# Patient Record
Sex: MALE | Race: WHITE | NOT HISPANIC OR LATINO
[De-identification: names, ages, dates, MRNs, and addresses within clinical notes are randomized per-mention and may not be internally consistent; named-entity substitution may affect disease eponyms.]

---

## 2017-01-09 ENCOUNTER — APPOINTMENT (OUTPATIENT)
Dept: NEUROLOGY | Facility: CLINIC | Age: 82
End: 2017-01-09

## 2017-01-11 ENCOUNTER — MEDICATION RENEWAL (OUTPATIENT)
Age: 82
End: 2017-01-11

## 2017-01-19 ENCOUNTER — APPOINTMENT (OUTPATIENT)
Dept: INTERNAL MEDICINE | Facility: CLINIC | Age: 82
End: 2017-01-19

## 2017-01-19 VITALS
HEART RATE: 75 BPM | WEIGHT: 137 LBS | OXYGEN SATURATION: 97 % | RESPIRATION RATE: 12 BRPM | SYSTOLIC BLOOD PRESSURE: 130 MMHG | DIASTOLIC BLOOD PRESSURE: 77 MMHG | BODY MASS INDEX: 22.82 KG/M2 | HEIGHT: 65 IN | TEMPERATURE: 97.8 F

## 2017-01-25 LAB
ALBUMIN SERPL ELPH-MCNC: 4.3 G/DL
ALP BLD-CCNC: 81 U/L
ALT SERPL-CCNC: 11 U/L
ANION GAP SERPL CALC-SCNC: 10 MMOL/L
AST SERPL-CCNC: 17 U/L
BILIRUB SERPL-MCNC: 0.4 MG/DL
BUN SERPL-MCNC: 17 MG/DL
CALCIUM SERPL-MCNC: 10 MG/DL
CHLORIDE SERPL-SCNC: 104 MMOL/L
CHOLEST SERPL-MCNC: 188 MG/DL
CHOLEST/HDLC SERPL: 3.9 RATIO
CO2 SERPL-SCNC: 28 MMOL/L
CREAT SERPL-MCNC: 0.85 MG/DL
GLUCOSE SERPL-MCNC: 119 MG/DL
HBA1C MFR BLD HPLC: 6.1 %
HDLC SERPL-MCNC: 48 MG/DL
LDLC SERPL CALC-MCNC: 108 MG/DL
POTASSIUM SERPL-SCNC: 4.9 MMOL/L
PROT SERPL-MCNC: 7.2 G/DL
SODIUM SERPL-SCNC: 142 MMOL/L
TRIGL SERPL-MCNC: 162 MG/DL

## 2017-02-01 LAB
BASOPHILS # BLD AUTO: 0.04 K/UL
BASOPHILS NFR BLD AUTO: 0.6 %
EOSINOPHIL # BLD AUTO: 0.27 K/UL
EOSINOPHIL NFR BLD AUTO: 4.2 %
HCT VFR BLD CALC: 48.7 %
HGB BLD-MCNC: 15.6 G/DL
IMM GRANULOCYTES NFR BLD AUTO: 0.2 %
LYMPHOCYTES # BLD AUTO: 2.73 K/UL
LYMPHOCYTES NFR BLD AUTO: 42.5 %
MAN DIFF?: NORMAL
MCHC RBC-ENTMCNC: 30.8 PG
MCHC RBC-ENTMCNC: 32 GM/DL
MCV RBC AUTO: 96.1 FL
MONOCYTES # BLD AUTO: 0.67 K/UL
MONOCYTES NFR BLD AUTO: 10.4 %
NEUTROPHILS # BLD AUTO: 2.7 K/UL
NEUTROPHILS NFR BLD AUTO: 42.1 %
PLATELET # BLD AUTO: 201 K/UL
RBC # BLD: 5.07 M/UL
RBC # FLD: 14.8 %
WBC # FLD AUTO: 6.42 K/UL

## 2017-02-13 ENCOUNTER — APPOINTMENT (OUTPATIENT)
Dept: NEUROLOGY | Facility: CLINIC | Age: 82
End: 2017-02-13

## 2017-02-17 ENCOUNTER — APPOINTMENT (OUTPATIENT)
Dept: INTERNAL MEDICINE | Facility: CLINIC | Age: 82
End: 2017-02-17

## 2017-02-17 VITALS
TEMPERATURE: 97.6 F | DIASTOLIC BLOOD PRESSURE: 77 MMHG | HEIGHT: 65 IN | OXYGEN SATURATION: 98 % | RESPIRATION RATE: 12 BRPM | HEART RATE: 75 BPM | BODY MASS INDEX: 23.16 KG/M2 | SYSTOLIC BLOOD PRESSURE: 145 MMHG | WEIGHT: 139 LBS

## 2017-03-02 LAB
ALBUMIN SERPL ELPH-MCNC: 4.2 G/DL
ALP BLD-CCNC: 74 U/L
ALT SERPL-CCNC: 16 U/L
ANION GAP SERPL CALC-SCNC: 10 MMOL/L
AST SERPL-CCNC: 17 U/L
BILIRUB SERPL-MCNC: 0.4 MG/DL
BUN SERPL-MCNC: 15 MG/DL
CALCIUM SERPL-MCNC: 10 MG/DL
CHLORIDE SERPL-SCNC: 105 MMOL/L
CHOLEST SERPL-MCNC: 218 MG/DL
CHOLEST/HDLC SERPL: 4.7 RATIO
CO2 SERPL-SCNC: 27 MMOL/L
CREAT SERPL-MCNC: 0.8 MG/DL
GLUCOSE SERPL-MCNC: 112 MG/DL
HDLC SERPL-MCNC: 46 MG/DL
LDLC SERPL CALC-MCNC: 133 MG/DL
POTASSIUM SERPL-SCNC: 5.8 MMOL/L
PROT SERPL-MCNC: 6.9 G/DL
SODIUM SERPL-SCNC: 142 MMOL/L
TRIGL SERPL-MCNC: 197 MG/DL

## 2017-03-03 ENCOUNTER — APPOINTMENT (OUTPATIENT)
Dept: INTERNAL MEDICINE | Facility: CLINIC | Age: 82
End: 2017-03-03

## 2017-03-03 VITALS
SYSTOLIC BLOOD PRESSURE: 108 MMHG | HEIGHT: 65 IN | WEIGHT: 141 LBS | OXYGEN SATURATION: 98 % | DIASTOLIC BLOOD PRESSURE: 67 MMHG | HEART RATE: 70 BPM | TEMPERATURE: 98.2 F | BODY MASS INDEX: 23.49 KG/M2 | RESPIRATION RATE: 12 BRPM

## 2017-03-09 ENCOUNTER — APPOINTMENT (OUTPATIENT)
Dept: INTERNAL MEDICINE | Facility: CLINIC | Age: 82
End: 2017-03-09

## 2017-03-09 VITALS
RESPIRATION RATE: 12 BRPM | HEIGHT: 65 IN | BODY MASS INDEX: 22.99 KG/M2 | SYSTOLIC BLOOD PRESSURE: 122 MMHG | TEMPERATURE: 97.7 F | OXYGEN SATURATION: 98 % | HEART RATE: 81 BPM | WEIGHT: 138 LBS | DIASTOLIC BLOOD PRESSURE: 65 MMHG

## 2017-03-09 DIAGNOSIS — M81.0 AGE-RELATED OSTEOPOROSIS W/OUT CURRENT PATHOLOGICAL FRACTURE: ICD-10-CM

## 2017-04-03 ENCOUNTER — APPOINTMENT (OUTPATIENT)
Dept: OTOLARYNGOLOGY | Facility: CLINIC | Age: 82
End: 2017-04-03

## 2017-04-03 VITALS
DIASTOLIC BLOOD PRESSURE: 71 MMHG | BODY MASS INDEX: 22.99 KG/M2 | SYSTOLIC BLOOD PRESSURE: 122 MMHG | HEIGHT: 65 IN | WEIGHT: 138 LBS | HEART RATE: 68 BPM

## 2017-04-03 DIAGNOSIS — H61.21 IMPACTED CERUMEN, RIGHT EAR: ICD-10-CM

## 2017-04-03 DIAGNOSIS — H90.3 SENSORINEURAL HEARING LOSS, BILATERAL: ICD-10-CM

## 2017-04-14 ENCOUNTER — MEDICATION RENEWAL (OUTPATIENT)
Age: 82
End: 2017-04-14

## 2017-04-24 ENCOUNTER — APPOINTMENT (OUTPATIENT)
Dept: NEUROLOGY | Facility: CLINIC | Age: 82
End: 2017-04-24

## 2017-04-24 VITALS
WEIGHT: 138 LBS | DIASTOLIC BLOOD PRESSURE: 77 MMHG | BODY MASS INDEX: 22.99 KG/M2 | HEART RATE: 77 BPM | SYSTOLIC BLOOD PRESSURE: 138 MMHG | HEIGHT: 65 IN

## 2017-04-24 DIAGNOSIS — Z82.0 FAMILY HISTORY OF EPILEPSY AND OTHER DISEASES OF THE NERVOUS SYSTEM: ICD-10-CM

## 2017-04-24 DIAGNOSIS — Z81.8 FAMILY HISTORY OF OTHER MENTAL AND BEHAVIORAL DISORDERS: ICD-10-CM

## 2017-04-24 RX ORDER — DOCUSATE SODIUM 100 MG/1
100 CAPSULE ORAL
Qty: 30 | Refills: 0 | Status: DISCONTINUED | COMMUNITY
Start: 2017-02-17 | End: 2017-04-24

## 2017-04-24 RX ORDER — LEVOFLOXACIN 500 MG/1
500 TABLET, FILM COATED ORAL DAILY
Qty: 10 | Refills: 0 | Status: DISCONTINUED | COMMUNITY
Start: 2017-03-03 | End: 2017-04-24

## 2017-05-08 ENCOUNTER — FORM ENCOUNTER (OUTPATIENT)
Age: 82
End: 2017-05-08

## 2017-05-09 ENCOUNTER — APPOINTMENT (OUTPATIENT)
Dept: NEUROLOGY | Facility: CLINIC | Age: 82
End: 2017-05-09

## 2017-05-09 ENCOUNTER — APPOINTMENT (OUTPATIENT)
Dept: MRI IMAGING | Facility: CLINIC | Age: 82
End: 2017-05-09

## 2017-05-09 ENCOUNTER — OUTPATIENT (OUTPATIENT)
Dept: OUTPATIENT SERVICES | Facility: HOSPITAL | Age: 82
LOS: 1 days | End: 2017-05-09
Payer: MEDICARE

## 2017-05-09 DIAGNOSIS — G31.84 MILD COGNITIVE IMPAIRMENT OF UNCERTAIN OR UNKNOWN ETIOLOGY: ICD-10-CM

## 2017-05-09 DIAGNOSIS — Z81.8 FAMILY HISTORY OF OTHER MENTAL AND BEHAVIORAL DISORDERS: ICD-10-CM

## 2017-05-09 PROCEDURE — 70551 MRI BRAIN STEM W/O DYE: CPT

## 2017-05-17 ENCOUNTER — NON-APPOINTMENT (OUTPATIENT)
Age: 82
End: 2017-05-17

## 2017-05-17 ENCOUNTER — APPOINTMENT (OUTPATIENT)
Dept: INTERNAL MEDICINE | Facility: CLINIC | Age: 82
End: 2017-05-17

## 2017-05-17 VITALS
TEMPERATURE: 98.3 F | SYSTOLIC BLOOD PRESSURE: 130 MMHG | DIASTOLIC BLOOD PRESSURE: 65 MMHG | OXYGEN SATURATION: 98 % | HEART RATE: 88 BPM | HEIGHT: 65 IN | WEIGHT: 140 LBS | RESPIRATION RATE: 12 BRPM | BODY MASS INDEX: 23.32 KG/M2

## 2017-05-18 LAB
25(OH)D3 SERPL-MCNC: 36.4 NG/ML
ALBUMIN SERPL ELPH-MCNC: 4.4 G/DL
ALP BLD-CCNC: 76 U/L
ALT SERPL-CCNC: 18 U/L
ANION GAP SERPL CALC-SCNC: 18 MMOL/L
AST SERPL-CCNC: 17 U/L
BASOPHILS # BLD AUTO: 0.04 K/UL
BASOPHILS NFR BLD AUTO: 0.5 %
BILIRUB SERPL-MCNC: 0.2 MG/DL
BUN SERPL-MCNC: 15 MG/DL
CALCIUM SERPL-MCNC: 10.3 MG/DL
CHLORIDE SERPL-SCNC: 105 MMOL/L
CHOLEST SERPL-MCNC: 173 MG/DL
CHOLEST/HDLC SERPL: 3.8 RATIO
CO2 SERPL-SCNC: 23 MMOL/L
CREAT SERPL-MCNC: 0.9 MG/DL
EOSINOPHIL # BLD AUTO: 0.11 K/UL
EOSINOPHIL NFR BLD AUTO: 1.5 %
FERRITIN SERPL-MCNC: 184 NG/ML
FOLATE SERPL-MCNC: >20 NG/ML
GLUCOSE SERPL-MCNC: 113 MG/DL
HBA1C MFR BLD HPLC: 6.1 %
HCT VFR BLD CALC: 47.3 %
HDLC SERPL-MCNC: 46 MG/DL
HGB BLD-MCNC: 14.9 G/DL
IMM GRANULOCYTES NFR BLD AUTO: 0.1 %
LDLC SERPL CALC-MCNC: 88 MG/DL
LYMPHOCYTES # BLD AUTO: 3.2 K/UL
LYMPHOCYTES NFR BLD AUTO: 42.7 %
MAN DIFF?: NORMAL
MCHC RBC-ENTMCNC: 30 PG
MCHC RBC-ENTMCNC: 31.5 GM/DL
MCV RBC AUTO: 95.2 FL
MONOCYTES # BLD AUTO: 0.57 K/UL
MONOCYTES NFR BLD AUTO: 7.6 %
NEUTROPHILS # BLD AUTO: 3.56 K/UL
NEUTROPHILS NFR BLD AUTO: 47.6 %
PLATELET # BLD AUTO: 176 K/UL
POTASSIUM SERPL-SCNC: 4.5 MMOL/L
PROT SERPL-MCNC: 7.2 G/DL
RBC # BLD: 4.97 M/UL
RBC # FLD: 15.6 %
SODIUM SERPL-SCNC: 146 MMOL/L
TRIGL SERPL-MCNC: 195 MG/DL
TSH SERPL-ACNC: 0.66 UIU/ML
VIT B12 SERPL-MCNC: 668 PG/ML
WBC # FLD AUTO: 7.49 K/UL

## 2017-05-19 LAB — RPR SER QL: NORMAL

## 2017-06-01 ENCOUNTER — INPATIENT (INPATIENT)
Facility: HOSPITAL | Age: 82
LOS: 4 days | Discharge: HOME | DRG: 391 | End: 2017-06-06
Attending: HOSPITALIST | Admitting: HOSPITALIST
Payer: MEDICARE

## 2017-06-01 ENCOUNTER — APPOINTMENT (OUTPATIENT)
Dept: INTERNAL MEDICINE | Facility: CLINIC | Age: 82
End: 2017-06-01

## 2017-06-01 VITALS
DIASTOLIC BLOOD PRESSURE: 72 MMHG | WEIGHT: 138 LBS | TEMPERATURE: 98.5 F | BODY MASS INDEX: 22.99 KG/M2 | SYSTOLIC BLOOD PRESSURE: 130 MMHG | HEIGHT: 65 IN | HEART RATE: 84 BPM | RESPIRATION RATE: 12 BRPM | OXYGEN SATURATION: 98 %

## 2017-06-01 VITALS
OXYGEN SATURATION: 100 % | RESPIRATION RATE: 17 BRPM | TEMPERATURE: 98 F | DIASTOLIC BLOOD PRESSURE: 67 MMHG | SYSTOLIC BLOOD PRESSURE: 118 MMHG | HEART RATE: 84 BPM

## 2017-06-01 DIAGNOSIS — R10.9 UNSPECIFIED ABDOMINAL PAIN: ICD-10-CM

## 2017-06-01 LAB
ALBUMIN SERPL ELPH-MCNC: 3.6 G/DL — SIGNIFICANT CHANGE UP (ref 3.3–5)
ALP SERPL-CCNC: 75 U/L — SIGNIFICANT CHANGE UP (ref 40–120)
ALT FLD-CCNC: 22 U/L RC — SIGNIFICANT CHANGE UP (ref 10–45)
ANION GAP SERPL CALC-SCNC: 13 MMOL/L — SIGNIFICANT CHANGE UP (ref 5–17)
AST SERPL-CCNC: 23 U/L — SIGNIFICANT CHANGE UP (ref 10–40)
BASOPHILS # BLD AUTO: 0 K/UL — SIGNIFICANT CHANGE UP (ref 0–0.2)
BASOPHILS NFR BLD AUTO: 0.1 % — SIGNIFICANT CHANGE UP (ref 0–2)
BILIRUB SERPL-MCNC: 0.4 MG/DL — SIGNIFICANT CHANGE UP (ref 0.2–1.2)
BUN SERPL-MCNC: 15 MG/DL — SIGNIFICANT CHANGE UP (ref 7–23)
CALCIUM SERPL-MCNC: 9.6 MG/DL — SIGNIFICANT CHANGE UP (ref 8.4–10.5)
CHLORIDE SERPL-SCNC: 102 MMOL/L — SIGNIFICANT CHANGE UP (ref 96–108)
CO2 SERPL-SCNC: 23 MMOL/L — SIGNIFICANT CHANGE UP (ref 22–31)
CREAT SERPL-MCNC: 0.92 MG/DL — SIGNIFICANT CHANGE UP (ref 0.5–1.3)
EOSINOPHIL # BLD AUTO: 0.1 K/UL — SIGNIFICANT CHANGE UP (ref 0–0.5)
EOSINOPHIL NFR BLD AUTO: 0.5 % — SIGNIFICANT CHANGE UP (ref 0–6)
GAS PNL BLDV: SIGNIFICANT CHANGE UP
GLUCOSE SERPL-MCNC: 133 MG/DL — HIGH (ref 70–99)
HCT VFR BLD CALC: 42.6 % — SIGNIFICANT CHANGE UP (ref 39–50)
HGB BLD-MCNC: 14 G/DL — SIGNIFICANT CHANGE UP (ref 13–17)
LIDOCAIN IGE QN: 18 U/L — SIGNIFICANT CHANGE UP (ref 7–60)
LYMPHOCYTES # BLD AUTO: 18.1 % — SIGNIFICANT CHANGE UP (ref 13–44)
LYMPHOCYTES # BLD AUTO: 2 K/UL — SIGNIFICANT CHANGE UP (ref 1–3.3)
MCHC RBC-ENTMCNC: 30.9 PG — SIGNIFICANT CHANGE UP (ref 27–34)
MCHC RBC-ENTMCNC: 32.9 GM/DL — SIGNIFICANT CHANGE UP (ref 32–36)
MCV RBC AUTO: 93.8 FL — SIGNIFICANT CHANGE UP (ref 80–100)
MONOCYTES # BLD AUTO: 0.9 K/UL — SIGNIFICANT CHANGE UP (ref 0–0.9)
MONOCYTES NFR BLD AUTO: 8.7 % — SIGNIFICANT CHANGE UP (ref 2–14)
NEUTROPHILS # BLD AUTO: 7.9 K/UL — HIGH (ref 1.8–7.4)
NEUTROPHILS NFR BLD AUTO: 72.7 % — SIGNIFICANT CHANGE UP (ref 43–77)
PLATELET # BLD AUTO: 163 K/UL — SIGNIFICANT CHANGE UP (ref 150–400)
POTASSIUM SERPL-MCNC: 4.5 MMOL/L — SIGNIFICANT CHANGE UP (ref 3.5–5.3)
POTASSIUM SERPL-SCNC: 4.5 MMOL/L — SIGNIFICANT CHANGE UP (ref 3.5–5.3)
PROT SERPL-MCNC: 7.2 G/DL — SIGNIFICANT CHANGE UP (ref 6–8.3)
RBC # BLD: 4.54 M/UL — SIGNIFICANT CHANGE UP (ref 4.2–5.8)
RBC # FLD: 12.7 % — SIGNIFICANT CHANGE UP (ref 10.3–14.5)
SODIUM SERPL-SCNC: 138 MMOL/L — SIGNIFICANT CHANGE UP (ref 135–145)
WBC # BLD: 10.9 K/UL — HIGH (ref 3.8–10.5)
WBC # FLD AUTO: 10.9 K/UL — HIGH (ref 3.8–10.5)

## 2017-06-01 PROCEDURE — 74177 CT ABD & PELVIS W/CONTRAST: CPT | Mod: 26

## 2017-06-01 PROCEDURE — 99285 EMERGENCY DEPT VISIT HI MDM: CPT

## 2017-06-01 PROCEDURE — 12345: CPT | Mod: GC,NC

## 2017-06-01 RX ORDER — UBIDECARENONE 100 MG
1 CAPSULE ORAL
Qty: 0 | Refills: 0 | COMMUNITY

## 2017-06-01 RX ORDER — SODIUM CHLORIDE 9 MG/ML
1000 INJECTION INTRAMUSCULAR; INTRAVENOUS; SUBCUTANEOUS
Qty: 0 | Refills: 0 | Status: DISCONTINUED | OUTPATIENT
Start: 2017-06-01 | End: 2017-06-03

## 2017-06-01 RX ORDER — CIPROFLOXACIN LACTATE 400MG/40ML
400 VIAL (ML) INTRAVENOUS ONCE
Qty: 0 | Refills: 0 | Status: COMPLETED | OUTPATIENT
Start: 2017-06-01 | End: 2017-06-01

## 2017-06-01 RX ORDER — SODIUM CHLORIDE 9 MG/ML
1000 INJECTION INTRAMUSCULAR; INTRAVENOUS; SUBCUTANEOUS ONCE
Qty: 0 | Refills: 0 | Status: COMPLETED | OUTPATIENT
Start: 2017-06-01 | End: 2017-06-01

## 2017-06-01 RX ORDER — ACETAMINOPHEN 500 MG
1000 TABLET ORAL ONCE
Qty: 0 | Refills: 0 | Status: COMPLETED | OUTPATIENT
Start: 2017-06-01 | End: 2017-06-01

## 2017-06-01 RX ORDER — CHOLECALCIFEROL (VITAMIN D3) 125 MCG
1000 CAPSULE ORAL DAILY
Qty: 0 | Refills: 0 | Status: DISCONTINUED | OUTPATIENT
Start: 2017-06-01 | End: 2017-06-06

## 2017-06-01 RX ORDER — CHOLECALCIFEROL (VITAMIN D3) 125 MCG
1 CAPSULE ORAL
Qty: 0 | Refills: 0 | COMMUNITY

## 2017-06-01 RX ORDER — ATORVASTATIN CALCIUM 80 MG/1
0 TABLET, FILM COATED ORAL
Qty: 0 | Refills: 0 | COMMUNITY

## 2017-06-01 RX ORDER — METRONIDAZOLE 500 MG
500 TABLET ORAL ONCE
Qty: 0 | Refills: 0 | Status: COMPLETED | OUTPATIENT
Start: 2017-06-01 | End: 2017-06-01

## 2017-06-01 RX ORDER — SIMVASTATIN 20 MG/1
10 TABLET, FILM COATED ORAL AT BEDTIME
Qty: 0 | Refills: 0 | Status: DISCONTINUED | OUTPATIENT
Start: 2017-06-01 | End: 2017-06-06

## 2017-06-01 RX ORDER — NIACIN 50 MG
500 TABLET ORAL AT BEDTIME
Qty: 0 | Refills: 0 | Status: DISCONTINUED | OUTPATIENT
Start: 2017-06-01 | End: 2017-06-02

## 2017-06-01 RX ADMIN — Medication 200 MILLIGRAM(S): at 18:22

## 2017-06-01 RX ADMIN — SODIUM CHLORIDE 1000 MILLILITER(S): 9 INJECTION INTRAMUSCULAR; INTRAVENOUS; SUBCUTANEOUS at 13:32

## 2017-06-01 RX ADMIN — Medication 100 MILLIGRAM(S): at 23:34

## 2017-06-01 RX ADMIN — SODIUM CHLORIDE 500 MILLILITER(S): 9 INJECTION INTRAMUSCULAR; INTRAVENOUS; SUBCUTANEOUS at 14:50

## 2017-06-01 RX ADMIN — SODIUM CHLORIDE 100 MILLILITER(S): 9 INJECTION INTRAMUSCULAR; INTRAVENOUS; SUBCUTANEOUS at 16:55

## 2017-06-01 RX ADMIN — Medication 1000 MILLIGRAM(S): at 13:59

## 2017-06-01 RX ADMIN — Medication 400 MILLIGRAM(S): at 13:32

## 2017-06-01 RX ADMIN — SODIUM CHLORIDE 100 MILLILITER(S): 9 INJECTION INTRAMUSCULAR; INTRAVENOUS; SUBCUTANEOUS at 19:50

## 2017-06-01 NOTE — H&P ADULT. - PROBLEM SELECTOR PLAN 1
Likely 2/2 Diverticulitis identified on  CT A/P   Pain control with Tylenol and Toradol  S/P Cipro & flagyl X 1   Would monitor off Abx   S/P 2L IVF  Will obtain GI consult   Will check lipase  Full liquid diet Likely 2/2 Diverticulitis identified on  CT A/P   Pain control with Tylenol and Toradol  S/P Cipro & flagyl X 1   Cont Abx   S/P 2L IVF  Will obtain GI consult   Will check lipase  Full liquid diet

## 2017-06-01 NOTE — H&P ADULT. - RADIOLOGY RESULTS AND INTERPRETATION
CT A/P: Acute uncomplicated diverticulitis of the sigmoid colon. 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 There is abutment with the proximal splenic artery and common hepatic artery.

## 2017-06-01 NOTE — H&P ADULT. - PROBLEM SELECTOR PLAN 2
Likely 2/2 Pancreatic insufficiency from Pancreatic Mass  GI for ?ERCP with biopsy   Consider supplementation of vitamins ADEK, consider CREON Likely 2/2 Diverticulitis vs. Pancreatic insufficiency from Pancreatic Mass  GI for ?ERCP with biopsy   Will hold of on stool softeners until discussed with GI  Consider supplementation of vitamins ADEK, consider CREON

## 2017-06-01 NOTE — ED PROVIDER NOTE - PHYSICAL EXAMINATION
Gen: NAD  Eyes:  sclerae white, no icterus  ENT: Moist mucous membranes. No exudates  Neck: supple, no LAD, mass or goiter, trachea midline  CV: RRR. Audible S1 and S2. No murmurs, rubs, gallops, S3, nor S4  Pulm: Clear to auscultation bilaterally. No wheezes, rales, or rhonchi  Abd: TTP right lower quadrant and left lower quadrant  Musculoskeletal:  No edema  Skin: no lesions or scars noted  Psych: mood good, affect full range and congruent with mood.  Neurologic: AAOx3

## 2017-06-01 NOTE — H&P ADULT. - RS GEN PE MLT RESP DETAILS PC
good air movement/respirations non-labored/no rhonchi/no wheezes/airway patent/clear to auscultation bilaterally/breath sounds equal/no rales/normal

## 2017-06-01 NOTE — ED ADULT NURSE NOTE - OBJECTIVE STATEMENT
89 y/o male sent in by primary care physician to r/o diverticulitis.   Pt c/o abd pain and last BM 5 days ago. No n/v/d, no dysuria, no chest pain, no SOB.  Skin warm and dry.  No acute respiratory distress noted.

## 2017-06-01 NOTE — H&P ADULT. - NEUROLOGICAL DETAILS
sensation intact/normal strength/alert and oriented x 3/responds to verbal commands/responds to pain

## 2017-06-01 NOTE — H&P ADULT. - NEGATIVE NEUROLOGICAL SYMPTOMS
no vertigo/no syncope/no tremors/no loss of consciousness/no difficulty walking/no weakness/no paresthesias/no facial palsy/no generalized seizures/no loss of sensation/no transient paralysis/no hemiparesis

## 2017-06-01 NOTE — ED PROVIDER NOTE - PROGRESS NOTE DETAILS
Pain persistent. Discussed results of CT w/ pt and family, including divertic & pancreatic mass. Will be admitted. Pt and family in agreement w/ plan. Call placed to primary care physician. Attending Kt: I received sign out on this pt. ct shows acute diverticulitis and possible pancreatic mass. will admit. given abx

## 2017-06-01 NOTE — H&P ADULT. - HISTORY OF PRESENT ILLNESS
Patient is an 89 yo M with PMHx hypercholesterolemia and chronic microvascular cerebral ischemic changes admitted with 5 days of lower quadrant abdominal pain and mucus noted in his stool, with multiple episodes of steatorrhea a day, with occasional incontinence.  Patient also reports subjective fevers. Patient unable to characterize quality of his abdominal pain, rates it 5/10, not associated with dietary intake or Patient is an 88 yo M with PMHx hypercholesterolemia and chronic microvascular cerebral ischemic changes admitted with 5 days of lower quadrant abdominal pain and mucus noted in his stool, with multiple episodes of steatorrhea a day, with occasional incontinence.  Patient also reports subjective fevers. Patient unable to characterize quality of his abdominal pain, rates it 5/10, not associated with dietary intake or bowel movements. Patient denies any n/v/cp/sob, reports occasional dizziness.     In the ED, vitals unremarkable, Labs noteable for mild leukocytosis 10.9, patient had CT A/P showing acute uncomplicated diverticulitis of the sigmoid colon. 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

## 2017-06-01 NOTE — ED PROVIDER NOTE - OBJECTIVE STATEMENT
89 y/o M w/ Hx of hyperchol p/w abdom pain. Sent in by primary care physician to r/o divertic. Last BM 5 days ago. No n/v or dysuria. Prior abdom surg for hernia many yrs ago.   primary care physician: Alan Sahu, [phone]

## 2017-06-01 NOTE — H&P ADULT. - ASSESSMENT
88 yo M with PMHx hypercholesterolemia and chronic microvascular cerebral ischemic changes admitted with 5 days of lower quadrant abdominal pain and mucus noted in his stool, with multiple episodes of steatorrhea a day, with occasional incontinence, and subjective fevers; found to have acute diverticulitis and pancreatic mass suspicious for adenoca on CT A/P

## 2017-06-02 DIAGNOSIS — K86.9 DISEASE OF PANCREAS, UNSPECIFIED: ICD-10-CM

## 2017-06-02 DIAGNOSIS — K57.92 DIVERTICULITIS OF INTESTINE, PART UNSPECIFIED, WITHOUT PERFORATION OR ABSCESS WITHOUT BLEEDING: ICD-10-CM

## 2017-06-02 DIAGNOSIS — E78.5 HYPERLIPIDEMIA, UNSPECIFIED: ICD-10-CM

## 2017-06-02 DIAGNOSIS — K90.9 INTESTINAL MALABSORPTION, UNSPECIFIED: ICD-10-CM

## 2017-06-02 DIAGNOSIS — Z29.9 ENCOUNTER FOR PROPHYLACTIC MEASURES, UNSPECIFIED: ICD-10-CM

## 2017-06-02 DIAGNOSIS — R10.9 UNSPECIFIED ABDOMINAL PAIN: ICD-10-CM

## 2017-06-02 LAB
ALBUMIN SERPL ELPH-MCNC: 3.6 G/DL — SIGNIFICANT CHANGE UP (ref 3.3–5)
ALP SERPL-CCNC: 74 U/L — SIGNIFICANT CHANGE UP (ref 40–120)
ALT FLD-CCNC: 18 U/L RC — SIGNIFICANT CHANGE UP (ref 10–45)
ANION GAP SERPL CALC-SCNC: 13 MMOL/L — SIGNIFICANT CHANGE UP (ref 5–17)
APPEARANCE UR: CLEAR — SIGNIFICANT CHANGE UP
AST SERPL-CCNC: 19 U/L — SIGNIFICANT CHANGE UP (ref 10–40)
BASOPHILS # BLD AUTO: 0 K/UL — SIGNIFICANT CHANGE UP (ref 0–0.2)
BASOPHILS NFR BLD AUTO: 0.2 % — SIGNIFICANT CHANGE UP (ref 0–2)
BILIRUB SERPL-MCNC: 0.5 MG/DL — SIGNIFICANT CHANGE UP (ref 0.2–1.2)
BILIRUB UR-MCNC: NEGATIVE — SIGNIFICANT CHANGE UP
BLD GP AB SCN SERPL QL: NEGATIVE — SIGNIFICANT CHANGE UP
BUN SERPL-MCNC: 9 MG/DL — SIGNIFICANT CHANGE UP (ref 7–23)
CALCIUM SERPL-MCNC: 8.6 MG/DL — SIGNIFICANT CHANGE UP (ref 8.4–10.5)
CHLORIDE SERPL-SCNC: 104 MMOL/L — SIGNIFICANT CHANGE UP (ref 96–108)
CHOLEST SERPL-MCNC: 145 MG/DL — SIGNIFICANT CHANGE UP (ref 10–199)
CO2 SERPL-SCNC: 23 MMOL/L — SIGNIFICANT CHANGE UP (ref 22–31)
COLOR SPEC: YELLOW — SIGNIFICANT CHANGE UP
CREAT SERPL-MCNC: 0.9 MG/DL — SIGNIFICANT CHANGE UP (ref 0.5–1.3)
DIFF PNL FLD: NEGATIVE — SIGNIFICANT CHANGE UP
EOSINOPHIL # BLD AUTO: 0.1 K/UL — SIGNIFICANT CHANGE UP (ref 0–0.5)
EOSINOPHIL NFR BLD AUTO: 1 % — SIGNIFICANT CHANGE UP (ref 0–6)
GLUCOSE SERPL-MCNC: 107 MG/DL — HIGH (ref 70–99)
GLUCOSE UR QL: NEGATIVE — SIGNIFICANT CHANGE UP
HCT VFR BLD CALC: 42.2 % — SIGNIFICANT CHANGE UP (ref 39–50)
HDLC SERPL-MCNC: 37 MG/DL — LOW (ref 40–125)
HGB BLD-MCNC: 13.3 G/DL — SIGNIFICANT CHANGE UP (ref 13–17)
INR BLD: 1.14 RATIO — SIGNIFICANT CHANGE UP (ref 0.88–1.16)
KETONES UR-MCNC: NEGATIVE — SIGNIFICANT CHANGE UP
LEUKOCYTE ESTERASE UR-ACNC: NEGATIVE — SIGNIFICANT CHANGE UP
LIDOCAIN IGE QN: 48 U/L — SIGNIFICANT CHANGE UP (ref 7–60)
LIPID PNL WITH DIRECT LDL SERPL: 83 MG/DL — SIGNIFICANT CHANGE UP
LYMPHOCYTES # BLD AUTO: 2.5 K/UL — SIGNIFICANT CHANGE UP (ref 1–3.3)
LYMPHOCYTES # BLD AUTO: 21.6 % — SIGNIFICANT CHANGE UP (ref 13–44)
MAGNESIUM SERPL-MCNC: 2 MG/DL — SIGNIFICANT CHANGE UP (ref 1.6–2.6)
MCHC RBC-ENTMCNC: 30 PG — SIGNIFICANT CHANGE UP (ref 27–34)
MCHC RBC-ENTMCNC: 31.6 GM/DL — LOW (ref 32–36)
MCV RBC AUTO: 94.9 FL — SIGNIFICANT CHANGE UP (ref 80–100)
MONOCYTES # BLD AUTO: 1 K/UL — HIGH (ref 0–0.9)
MONOCYTES NFR BLD AUTO: 8.7 % — SIGNIFICANT CHANGE UP (ref 2–14)
NEUTROPHILS # BLD AUTO: 8 K/UL — HIGH (ref 1.8–7.4)
NEUTROPHILS NFR BLD AUTO: 68.4 % — SIGNIFICANT CHANGE UP (ref 43–77)
NITRITE UR-MCNC: NEGATIVE — SIGNIFICANT CHANGE UP
PH UR: 6 — SIGNIFICANT CHANGE UP (ref 5–8)
PHOSPHATE SERPL-MCNC: 2.2 MG/DL — LOW (ref 2.5–4.5)
PLATELET # BLD AUTO: 179 K/UL — SIGNIFICANT CHANGE UP (ref 150–400)
POTASSIUM SERPL-MCNC: 4.1 MMOL/L — SIGNIFICANT CHANGE UP (ref 3.5–5.3)
POTASSIUM SERPL-SCNC: 4.1 MMOL/L — SIGNIFICANT CHANGE UP (ref 3.5–5.3)
PROT SERPL-MCNC: 7 G/DL — SIGNIFICANT CHANGE UP (ref 6–8.3)
PROT UR-MCNC: SIGNIFICANT CHANGE UP
PROTHROM AB SERPL-ACNC: 12.4 SEC — SIGNIFICANT CHANGE UP (ref 9.8–12.7)
RBC # BLD: 4.45 M/UL — SIGNIFICANT CHANGE UP (ref 4.2–5.8)
RBC # FLD: 12.9 % — SIGNIFICANT CHANGE UP (ref 10.3–14.5)
RBC CASTS # UR COMP ASSIST: SIGNIFICANT CHANGE UP /HPF (ref 0–2)
RH IG SCN BLD-IMP: POSITIVE — SIGNIFICANT CHANGE UP
SODIUM SERPL-SCNC: 140 MMOL/L — SIGNIFICANT CHANGE UP (ref 135–145)
SP GR SPEC: >1.03 — HIGH (ref 1.01–1.02)
TOTAL CHOLESTEROL/HDL RATIO MEASUREMENT: 3.9 RATIO — SIGNIFICANT CHANGE UP (ref 3.4–9.6)
TRIGL SERPL-MCNC: 124 MG/DL — SIGNIFICANT CHANGE UP (ref 10–149)
UROBILINOGEN FLD QL: NEGATIVE — SIGNIFICANT CHANGE UP
WBC # BLD: 11.7 K/UL — HIGH (ref 3.8–10.5)
WBC # FLD AUTO: 11.7 K/UL — HIGH (ref 3.8–10.5)
WBC UR QL: SIGNIFICANT CHANGE UP /HPF (ref 0–5)

## 2017-06-02 PROCEDURE — 71020: CPT | Mod: 26

## 2017-06-02 PROCEDURE — 99223 1ST HOSP IP/OBS HIGH 75: CPT | Mod: GC

## 2017-06-02 PROCEDURE — 93010 ELECTROCARDIOGRAM REPORT: CPT

## 2017-06-02 RX ORDER — ACETAMINOPHEN 500 MG
650 TABLET ORAL EVERY 6 HOURS
Qty: 0 | Refills: 0 | Status: DISCONTINUED | OUTPATIENT
Start: 2017-06-02 | End: 2017-06-06

## 2017-06-02 RX ORDER — CIPROFLOXACIN LACTATE 400MG/40ML
400 VIAL (ML) INTRAVENOUS EVERY 12 HOURS
Qty: 0 | Refills: 0 | Status: DISCONTINUED | OUTPATIENT
Start: 2017-06-02 | End: 2017-06-05

## 2017-06-02 RX ORDER — KETOROLAC TROMETHAMINE 30 MG/ML
15 SYRINGE (ML) INJECTION EVERY 8 HOURS
Qty: 0 | Refills: 0 | Status: DISCONTINUED | OUTPATIENT
Start: 2017-06-02 | End: 2017-06-06

## 2017-06-02 RX ORDER — METRONIDAZOLE 500 MG
500 TABLET ORAL EVERY 8 HOURS
Qty: 0 | Refills: 0 | Status: DISCONTINUED | OUTPATIENT
Start: 2017-06-02 | End: 2017-06-05

## 2017-06-02 RX ORDER — HEPARIN SODIUM 5000 [USP'U]/ML
5000 INJECTION INTRAVENOUS; SUBCUTANEOUS EVERY 8 HOURS
Qty: 0 | Refills: 0 | Status: DISCONTINUED | OUTPATIENT
Start: 2017-06-02 | End: 2017-06-06

## 2017-06-02 RX ADMIN — Medication 200 MILLIGRAM(S): at 17:03

## 2017-06-02 RX ADMIN — Medication 100 MILLIGRAM(S): at 22:07

## 2017-06-02 RX ADMIN — Medication 1000 UNIT(S): at 13:27

## 2017-06-02 RX ADMIN — HEPARIN SODIUM 5000 UNIT(S): 5000 INJECTION INTRAVENOUS; SUBCUTANEOUS at 13:28

## 2017-06-02 RX ADMIN — Medication 650 MILLIGRAM(S): at 11:49

## 2017-06-02 RX ADMIN — Medication 650 MILLIGRAM(S): at 12:19

## 2017-06-02 RX ADMIN — Medication 100 MILLIGRAM(S): at 13:30

## 2017-06-03 LAB
CANCER AG19-9 SERPL-ACNC: 766 U/ML — HIGH
HCT VFR BLD CALC: 40.3 % — SIGNIFICANT CHANGE UP (ref 39–50)
HGB BLD-MCNC: 13.2 G/DL — SIGNIFICANT CHANGE UP (ref 13–17)
MCHC RBC-ENTMCNC: 31 PG — SIGNIFICANT CHANGE UP (ref 27–34)
MCHC RBC-ENTMCNC: 32.7 GM/DL — SIGNIFICANT CHANGE UP (ref 32–36)
MCV RBC AUTO: 94.8 FL — SIGNIFICANT CHANGE UP (ref 80–100)
PHOSPHATE SERPL-MCNC: 2.5 MG/DL — SIGNIFICANT CHANGE UP (ref 2.5–4.5)
PLATELET # BLD AUTO: 173 K/UL — SIGNIFICANT CHANGE UP (ref 150–400)
RBC # BLD: 4.25 M/UL — SIGNIFICANT CHANGE UP (ref 4.2–5.8)
RBC # FLD: 12.8 % — SIGNIFICANT CHANGE UP (ref 10.3–14.5)
WBC # BLD: 7.5 K/UL — SIGNIFICANT CHANGE UP (ref 3.8–10.5)
WBC # FLD AUTO: 7.5 K/UL — SIGNIFICANT CHANGE UP (ref 3.8–10.5)

## 2017-06-03 PROCEDURE — 99233 SBSQ HOSP IP/OBS HIGH 50: CPT | Mod: GC

## 2017-06-03 RX ADMIN — Medication 100 MILLIGRAM(S): at 06:19

## 2017-06-03 RX ADMIN — Medication 650 MILLIGRAM(S): at 08:32

## 2017-06-03 RX ADMIN — HEPARIN SODIUM 5000 UNIT(S): 5000 INJECTION INTRAVENOUS; SUBCUTANEOUS at 22:12

## 2017-06-03 RX ADMIN — HEPARIN SODIUM 5000 UNIT(S): 5000 INJECTION INTRAVENOUS; SUBCUTANEOUS at 00:06

## 2017-06-03 RX ADMIN — HEPARIN SODIUM 5000 UNIT(S): 5000 INJECTION INTRAVENOUS; SUBCUTANEOUS at 06:19

## 2017-06-03 RX ADMIN — SIMVASTATIN 10 MILLIGRAM(S): 20 TABLET, FILM COATED ORAL at 22:12

## 2017-06-03 RX ADMIN — Medication 1000 UNIT(S): at 12:18

## 2017-06-03 RX ADMIN — SIMVASTATIN 10 MILLIGRAM(S): 20 TABLET, FILM COATED ORAL at 00:06

## 2017-06-03 RX ADMIN — Medication 650 MILLIGRAM(S): at 20:44

## 2017-06-03 RX ADMIN — Medication 100 MILLIGRAM(S): at 22:12

## 2017-06-03 RX ADMIN — Medication 100 MILLIGRAM(S): at 14:40

## 2017-06-03 RX ADMIN — Medication 200 MILLIGRAM(S): at 08:31

## 2017-06-03 RX ADMIN — Medication 200 MILLIGRAM(S): at 20:07

## 2017-06-03 RX ADMIN — HEPARIN SODIUM 5000 UNIT(S): 5000 INJECTION INTRAVENOUS; SUBCUTANEOUS at 14:38

## 2017-06-03 RX ADMIN — Medication 650 MILLIGRAM(S): at 21:40

## 2017-06-03 NOTE — DISCHARGE NOTE ADULT - MEDICATION SUMMARY - MEDICATIONS TO STOP TAKING
I will STOP taking the medications listed below when I get home from the hospital:  None I will STOP taking the medications listed below when I get home from the hospital:    simvastatin 10 mg oral tablet  -- 1 tab(s) by mouth once a day (at bedtime)

## 2017-06-03 NOTE — DISCHARGE NOTE ADULT - NS AS DC STROKE ED MATERIALS
Risk Factors for Stroke/Stroke Warning Signs and Symptoms/Stroke Education Booklet/Call 911 for Stroke/Need for Followup After Discharge/Prescribed Medications

## 2017-06-03 NOTE — DIETITIAN INITIAL EVALUATION ADULT. - NS AS NUTRI INTERV ED CONTENT
Nutrition relationship to health/disease/Purpose of the nutrition education/Reviewed what diverticulitis is and how diet and fiber is related, discussed clear liquid diet and allowances, reinforced intake of lean proteins for muscle.

## 2017-06-03 NOTE — DISCHARGE NOTE ADULT - CARE PROVIDERS DIRECT ADDRESSES
[email or web address]

## 2017-06-03 NOTE — DISCHARGE NOTE ADULT - CARE PLAN
Principal Discharge DX:	Pancreatic mass  Goal:	Management  Instructions for follow-up, activity and diet:	Please follow-up with surgical oncology Dr. Johan Lynn within 1-2 weeks after discharge to  f/u pancreatic mass biopsy results.  Secondary Diagnosis:	Diverticulitis  Instructions for follow-up, activity and diet:	Please c/w flagyl x ___ days as instructed and f/u with your PCP within 2-4 weeks after discharge.  Secondary Diagnosis:	Hyperlipidemia  Instructions for follow-up, activity and diet:	Please c/w statin as instructed and f/u with your PCP after discharge. Principal Discharge DX:	Pancreatic mass  Goal:	Management  Instructions for follow-up, activity and diet:	Please follow-up with surgical oncology Dr. Johan Lynn within 1-2 weeks after discharge to f/u pancreatic mass biopsy results.  Secondary Diagnosis:	Diverticulitis  Instructions for follow-up, activity and diet:	Please f/u with your PCP within 2-4 weeks after discharge.  Secondary Diagnosis:	Hyperlipidemia  Instructions for follow-up, activity and diet:	Please c/w statin as instructed and f/u with your PCP after discharge. Principal Discharge DX:	Pancreatic mass  Goal:	Management  Instructions for follow-up, activity and diet:	Please follow-up with surgical oncology Dr. Johan Lynn within 1-2 weeks after discharge to f/u pancreatic mass biopsy results. Please obtain a CT chest as an outpatient prior to appointment with Dr. Lynn. Please continue with PO Augmetin 500 twice daily on 6/7/17 and 1 tablet on 6/8/17 to complete a 3 day course.  Secondary Diagnosis:	Diverticulitis  Instructions for follow-up, activity and diet:	Please f/u with your PCP within 2-4 weeks after discharge.  Secondary Diagnosis:	Hyperlipidemia  Instructions for follow-up, activity and diet:	Please c/w statin as instructed and f/u with your PCP after discharge.  Secondary Diagnosis:	Transaminitis  Instructions for follow-up, activity and diet:	Transaminitis likely secondary to antibiotics for sigmoid diverticulitis that plateaued during hospitalization. Please obtain CMP with PCP within 3 days after discharge to trend LFTs.

## 2017-06-03 NOTE — DISCHARGE NOTE ADULT - CARE PROVIDER_API CALL
Johan Lynn), Surgery; Surgical Oncology  [address] [place] [id number]  Phone: [phone]  Fax: [phone]

## 2017-06-03 NOTE — DIETITIAN INITIAL EVALUATION ADULT. - PROBLEM SELECTOR PLAN 2
Likely 2/2 Diverticulitis vs. Pancreatic insufficiency from Pancreatic Mass  GI for ?ERCP with biopsy   Will hold of on stool softeners until discussed with GI  Consider supplementation of vitamins ADEK, consider CREON

## 2017-06-03 NOTE — DIETITIAN INITIAL EVALUATION ADULT. - PROBLEM SELECTOR PLAN 1
Likely 2/2 Diverticulitis identified on  CT A/P   Pain control with Tylenol and Toradol  S/P Cipro & flagyl X 1   Cont Abx   S/P 2L IVF  Will obtain GI consult   Will check lipase  Full liquid diet

## 2017-06-03 NOTE — DISCHARGE NOTE ADULT - HOSPITAL COURSE
Patient is an 89 yo M with PMHx hypercholesterolemia and chronic microvascular cerebral ischemic changes admitted with 5 days of lower quadrant abdominal pain and mucus noted in his stool, with multiple episodes of steatorrhea a day, with occasional incontinence.  Patient also reports subjective fevers. Patient unable to characterize quality of his abdominal pain, rates it 5/10, not associated with dietary intake or bowel movements. Patient denies any n/v/cp/sob, reports occasional dizziness.     In the ED, vitals unremarkable, Labs noteable for mild leukocytosis 10.9, patient had CT A/P showing acute uncomplicated diverticulitis of the sigmoid colon. 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     Patient admitted to general medicine for further management. GI was consulted for EUS guided biopsy of pancreatic neck mass. Biopsy results showed _________. Patient continued on antibiotics for sigmoid diverticulitis. Patient is an 89 yo M with PMHx hypercholesterolemia and chronic microvascular cerebral ischemic changes admitted with 5 days of lower quadrant abdominal pain and mucus noted in his stool, with multiple episodes of steatorrhea a day, with occasional incontinence.  Patient also reports subjective fevers. Patient unable to characterize quality of his abdominal pain, rates it 5/10, not associated with dietary intake or bowel movements. Patient denies any n/v/cp/sob, reports occasional dizziness.     In the ED, vitals unremarkable, Labs noteable for mild leukocytosis 10.9, patient had CT A/P showing acute uncomplicated diverticulitis of the sigmoid colon. 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     Patient admitted to general medicine for further management. GI was consulted for EUS guided biopsy of pancreatic neck mass performed on June 5th. Patient instructed to follow-up with biopsy results with PCP/surgical oncology Dr. Johan Lynn. Patient continued on antibiotics for sigmoid diverticulitis. Course was complicated by mild transaminitis for which ciprofloxacin was discontinued. Patient was instructed to f/u with surgical oncology within 1 week after discharge.     Patient medically stable upon discharge.    Primary discharge diagnosis: Pancreatic neck mass  - patient had EUS guided biopsy of pancreatic neck mass  - patient was seen by Dr. Johan Lynn (surgical oncology) while inpatient  - he was instructed to f/u with surg onc within 1 week after discharge Patient is an 88 yo M with PMHx hypercholesterolemia and chronic microvascular cerebral ischemic changes admitted with 5 days of lower quadrant abdominal pain and mucus noted in his stool, with multiple episodes of steatorrhea a day, with occasional incontinence.  Patient also reports subjective fevers. Patient unable to characterize quality of his abdominal pain, rates it 5/10, not associated with dietary intake or bowel movements. Patient denies any n/v/cp/sob, reports occasional dizziness.     In the ED, vitals unremarkable, Labs noteable for mild leukocytosis 10.9, patient had CT A/P showing acute uncomplicated diverticulitis of the sigmoid colon. 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     Patient admitted to general medicine for further management. For patient's sigmoid diverticulitis, a course of cipro and flagyl was completed while patient remained hospitalized. GI was consulted for EUS guided biopsy of pancreatic neck mass performed on June 5th. Patient instructed to follow-up with biopsy results with PCP/surgical oncology Dr. Johan Lynn. Patient continued on antibiotics for sigmoid diverticulitis. Course was complicated by mild transaminitis for which ciprofloxacin was discontinued. Patient was instructed to f/u with surgical oncology within 1 week after discharge.     Patient medically stable upon discharge.    Primary discharge diagnosis: Pancreatic neck mass  - patient had EUS guided biopsy of pancreatic neck mass  - patient was seen by Dr. Johan Lynn (surgical oncology) while inpatient  - he was instructed to f/u with surg onc within 1 week after discharge Patient is an 88 yo M with PMHx hypercholesterolemia and chronic microvascular cerebral ischemic changes admitted with 5 days of lower quadrant abdominal pain and mucus noted in his stool, with multiple episodes of steatorrhea a day, with occasional incontinence.  Patient also reports subjective fevers. Patient unable to characterize quality of his abdominal pain, rates it 5/10, not associated with dietary intake or bowel movements. Patient denies any n/v/cp/sob, reports occasional dizziness.     In the ED, vitals unremarkable, Labs noteable for mild leukocytosis 10.9, patient had CT A/P showing acute uncomplicated diverticulitis of the sigmoid colon. 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     Patient admitted to general medicine for further management. For patient's sigmoid diverticulitis, a course of cipro and flagyl was completed while patient remained hospitalized. GI was consulted for EUS guided biopsy of pancreatic neck mass performed on June 5th. Patient instructed to follow-up with biopsy results with PCP/surgical oncology Dr. Johan Lynn. Patient continued on antibiotics for sigmoid diverticulitis. Course was complicated by mild transaminitis for which ciprofloxacin and simvastatin was discontinued. Patient was instructed to f/u with surgical oncology within 1 week after discharge.     Patient medically stable upon discharge.    Primary discharge diagnosis: Pancreatic neck mass  - patient had EUS guided biopsy of pancreatic neck mass  - patient was seen by Dr. Johan Lynn (surgical oncology) while inpatient  - he was instructed to f/u with surg onc within 1 week after discharge     other discharge diagnosis: pancreatic cyst, hyperlipidemia Patient is an 89 yo M with PMHx hypercholesterolemia and chronic microvascular cerebral ischemic changes admitted with 5 days of lower quadrant abdominal pain and mucus noted in his stool, with multiple episodes of steatorrhea a day, with occasional incontinence.  Patient also reports subjective fevers. Patient unable to characterize quality of his abdominal pain, rates it 5/10, not associated with dietary intake or bowel movements. Patient denies any n/v/cp/sob, reports occasional dizziness.     Hospital Course:   In the ED, vitals unremarkable, Labs note able for mild leukocytosis 10.9, patient had CT A/P showing acute uncomplicated diverticulitis of the sigmoid colon. 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 Patient admitted to general medicine for further management. For patient's sigmoid diverticulitis, a course of cipro and flagyl was completed while patient remained hospitalized and his symptoms improved. GI was consulted for EUS guided biopsy of pancreatic neck mass performed on June 5th. CA 19-9 levels were elevated at 766. Patient instructed to follow-up with biopsy results with PCP/surgical oncology Dr. Johan Lynn. . Course was complicated by mild transaminitis which was likely drug induced and occurred prior to pancreatic mass biopsy so  ciprofloxacin, PPI and simvastatin was discontinued. His transaminitis plateaued and he was instructed to follow up with his PMD of GI by this Friday for repeat CMP to check on his transaminitis as he and his family refusing further monitoring of his liver enzymes. Patient was instructed to f/u with surgical oncology, Dr. Lynn and GI within 1 week after discharge. Family refused CT chest while inpatient, can be done as outpatient Patient medically stable upon discharge.    Discharge Diagnosis:   Acute Uncomplicated Diverticulitis  Pancreatic mass s/p biopsy (concern for pancreatic CA)  Pancreatic cyst  Transaminitis  Hyperlipidemia Patient is an 88 yo M with PMHx hypercholesterolemia and chronic microvascular cerebral ischemic changes admitted with 5 days of lower quadrant abdominal pain and mucus noted in his stool, with multiple episodes of steatorrhea a day, with occasional incontinence.  Patient also reports subjective fevers. Patient unable to characterize quality of his abdominal pain, rates it 5/10, not associated with dietary intake or bowel movements. Patient denies any n/v/cp/sob, reports occasional dizziness.     Hospital Course:   In the ED, vitals unremarkable, Labs note able for mild leukocytosis 10.9, patient had CT A/P showing acute uncomplicated diverticulitis of the sigmoid colon. 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 Patient admitted to general medicine for further management. For patient's sigmoid diverticulitis, a course of cipro and flagyl was completed while patient remained hospitalized and his symptoms improved. GI was consulted for EUS guided biopsy of pancreatic neck mass performed on June 5th. CA 19-9 levels were elevated at 766. Patient instructed to follow-up with biopsy results with PCP/surgical oncology Dr. Johan Lynn. . Course was complicated by mild transaminitis which was likely drug induced and occurred prior to pancreatic mass biopsy so  ciprofloxacin, PPI and simvastatin was discontinued. His transaminitis plateaued and he was instructed to follow up with his PMD of GI by this Friday for repeat CMP to check on his transaminitis as he and his family refusing further monitoring of his liver enzymes. Patient was instructed to f/u with surgical oncology, Dr. Lynn and GI within 1 week after discharge. Per GI pt should continue 3 days of PO Augmentin given his pancreatic biopsy. Family refused CT chest while inpatient, can be done as outpatient Patient medically stable upon discharge.    Discharge Diagnosis:   Acute Uncomplicated Diverticulitis  Pancreatic mass s/p biopsy (concern for pancreatic CA)  Pancreatic cyst  Transaminitis  Hyperlipidemia

## 2017-06-03 NOTE — DIETITIAN INITIAL EVALUATION ADULT. - OTHER INFO
Patient referred for assessment.  Patient found sitting in bed and eating home made vegetable pureed soup.  Patient advised that he is on clear liquids and discussed what a clear liquid is and allowances.  Wife also brought in cookies and pears! but patient did not eat.  daughter reinforced diet, too.  Appetite and intake has been good but patient notes constipation over the last 3 months and eating less than usual.  Hence, wt loss 5 pounds recently.  Patient eats a lot of vegetables, fruits, eggs, fish, chickory, cereal, bread and beans.  Admits to history of constipation.  Takes Vit D3, Niacin, CoQ10.and feels that maybe vitamins have bound him up.

## 2017-06-03 NOTE — DISCHARGE NOTE ADULT - PATIENT PORTAL LINK FT
“You can access the Follow[facility] Patient Portal, offered by [facility], by registering with the following website: [email or web address]”

## 2017-06-03 NOTE — DIETITIAN INITIAL EVALUATION ADULT. - ENERGY NEEDS
HT 67 inches,  pounds, Current wt 142 pounds, BMI 22.3  Dxd with Diverticulitis, Pancreatic mass.  Skin intact.  Well nourished with good muscle.

## 2017-06-03 NOTE — DISCHARGE NOTE ADULT - PLAN OF CARE
Management Please follow-up with surgical oncology Dr. Johan Lynn within 1-2 weeks after discharge to  f/u pancreatic mass biopsy results. Please c/w flagyl x ___ days as instructed and f/u with your PCP within 2-4 weeks after discharge. Please c/w statin as instructed and f/u with your PCP after discharge. Please follow-up with surgical oncology Dr. Johan Lynn within 1-2 weeks after discharge to f/u pancreatic mass biopsy results. Please f/u with your PCP within 2-4 weeks after discharge. Transaminitis likely secondary to antibiotics for sigmoid diverticulitis that plateaued during hospitalization. Please obtain CMP with PCP within 3 days after discharge to trend LFTs. Please follow-up with surgical oncology Dr. Johan Lynn within 1-2 weeks after discharge to f/u pancreatic mass biopsy results. Please obtain a CT chest as an outpatient prior to appointment with Dr. Lynn. Please continue with PO Augmetin 500 twice daily on 6/7/17 and 1 tablet on 6/8/17 to complete a 3 day course.

## 2017-06-03 NOTE — DISCHARGE NOTE ADULT - MEDICATION SUMMARY - MEDICATIONS TO TAKE
I will START or STAY ON the medications listed below when I get home from the hospital:    simvastatin 10 mg oral tablet  -- 1 tab(s) by mouth once a day (at bedtime)  -- Indication: For HLD    niacin 250 mg oral tablet, extended release  -- 1 tab(s) by mouth once a day (in the morning)  -- Indication: For HLD    CoQ10  -- 100 milligram(s) by mouth once a day  -- Indication: For Supplement    Vitamin D3  -- 1000 unit(s) by mouth once a day  -- Indication: For Supplement I will START or STAY ON the medications listed below when I get home from the hospital:    niacin 250 mg oral tablet, extended release  -- 1 tab(s) by mouth once a day (in the morning)  -- Indication: For HLD    CoQ10  -- 100 milligram(s) by mouth once a day  -- Indication: For Supplement    amoxicillin-clavulanate 500 mg-125 mg oral tablet  -- 1 tab(s) by mouth 2 times a day MDD:two tablets daily  -- Indication: For S/p pancreatic cyst biopsy    Vitamin D3  -- 1000 unit(s) by mouth once a day  -- Indication: For Supplement

## 2017-06-04 PROCEDURE — 99233 SBSQ HOSP IP/OBS HIGH 50: CPT | Mod: GC

## 2017-06-04 RX ADMIN — HEPARIN SODIUM 5000 UNIT(S): 5000 INJECTION INTRAVENOUS; SUBCUTANEOUS at 05:29

## 2017-06-04 RX ADMIN — HEPARIN SODIUM 5000 UNIT(S): 5000 INJECTION INTRAVENOUS; SUBCUTANEOUS at 21:30

## 2017-06-04 RX ADMIN — SIMVASTATIN 10 MILLIGRAM(S): 20 TABLET, FILM COATED ORAL at 21:30

## 2017-06-04 RX ADMIN — Medication 200 MILLIGRAM(S): at 17:26

## 2017-06-04 RX ADMIN — Medication 100 MILLIGRAM(S): at 14:20

## 2017-06-04 RX ADMIN — HEPARIN SODIUM 5000 UNIT(S): 5000 INJECTION INTRAVENOUS; SUBCUTANEOUS at 14:20

## 2017-06-04 RX ADMIN — Medication 200 MILLIGRAM(S): at 08:34

## 2017-06-04 RX ADMIN — Medication 100 MILLIGRAM(S): at 21:30

## 2017-06-04 RX ADMIN — Medication 100 MILLIGRAM(S): at 05:29

## 2017-06-04 RX ADMIN — Medication 1000 UNIT(S): at 11:34

## 2017-06-04 RX ADMIN — Medication 650 MILLIGRAM(S): at 05:50

## 2017-06-04 NOTE — PHYSICAL THERAPY INITIAL EVALUATION ADULT - ADDITIONAL COMMENTS
Pt resides in 2 family home on the 2nd floor with his wife.  His son lives on ground floor.  Pt has 2 steps to enter and 16 inside with railing.  PTA pt was functionally IND.

## 2017-06-04 NOTE — PHYSICAL THERAPY INITIAL EVALUATION ADULT - AMBULATION SKILLS, REHAB EVAL
[facility]    [address] [address]    Phone:  [phone]    Fax:  [phone]                                       Molly Teague   MRN: [id number]    Department:  [facility]   Date of Visit:  4/19/2017           Patient Information     Date Of Birth          1985        Your diagnoses for this visit were:     Acute pharyngitis, unspecified etiology     Strep throat exposure     Facial rash        You were seen by Tricia Turner PA-C.      Follow-up Information     Follow up with Grecia Barba DO.    Specialty:  Internal Medicine    Why:  if no improvement or return to ER sooner if new or worening symptoms     Contact information:    [facility]  [address] [address]  [phone]          Discharge Instructions          * PHARYNGITIS (Sore Throat),REPORT PENDING    Pharyngitis (sore throat) is often due to a virus, but can also be caused by the  strep  bacteria. This is called  strep throat . Both viral and strep infection can cause throat pain that is worse when swallowing, aching all over with headache and fever. Both types of infections are contagious. They may be spread by coughing, kissing or touching others after touching your mouth or nose, so wash your hands often.  A test has been done to determine whether or not you have strep throat. If it is positive for strep infection you will usually need to take antibiotics. If the test is negative, you probably have a viral pharyngitis, and antibiotic treatment will not help you recover.  HOME CARE:    If your symptoms are severe, rest at home for the first 2-3 days. If you are told that your test is positive for strep, you should be off work and school for the first two days of antibiotic treatment. After that, you will no longer be as contagious.    Children: Use acetaminophen (Tylenol) for fever, fussiness or discomfort. In infants over six  months of age, you may use ibuprofen (Children's Motrin) instead of Tylenol. [NOTE: If your child has chronic liver or kidney disease or ever had a stomach ulcer or GI bleeding, talk with your doctor before using these medicines.]   (Aspirin should never be used in anyone under 18 years of age who is ill with a fever. It may cause severe liver damage.)  Adults: You may use acetaminophen (Tylenol) 650-1000 mg every 6 hours or ibuprofen (Motrin, Advil) 600 mg every 6-8 hours with food to control pain, if you are able to take these medicines. [NOTE: If you have chronic liver or kidney disease or ever had a stomach ulcer or GI bleeding, talk with your doctor before using these medicines.]    Throat lozenges or sprays (Chloraseptic and others), or gargling with warm salt water will reduce throat pain. Dissolve 1/2 teaspoon of salt in 1 glass of warm water. This is especially useful just before meals.     FOLLOW UP with your doctor as advised by our staff if you are not improving over the next week.  GET PROMPT MEDICAL ATTENTION  if any of the following occur:    Fever over 101 F (38.3 C) for more than three days    New or worsening ear pain, sinus pain or headache    Unable to swallow liquids or open your mouth wide due to throat pain    Trouble breathing    Muffled voice    New rash       [id number] [place] [address]. All rights reserved. This information is not intended as a substitute for professional medical care. Always follow your healthcare professional's instructions.      24 Hour Appointment Hotline       To make an appointment at any [facility] clinic, call [phone] ([phone]). If you don't have a family doctor or clinic, we will help you find one. [facility] clinics are conveniently located to serve the needs of you and your family.             Review of your medicines      START taking        Dose / Directions Last dose taken    cephALEXin 500 MG capsule    Commonly known as:  KEFLEX   Dose:  1000 mg   Quantity:  40 capsule        Take 2 capsules (1,000 mg) by mouth 2 times daily for 10 days   Refills:  0          Our records show that you are taking the medicines listed below. If these are incorrect, please call your family doctor or clinic.        Dose / Directions Last dose taken    albuterol 108 (90 BASE) MCG/ACT Inhaler   Commonly known as:  VENTOLIN HFA   Dose:  2 puff   Quantity:  1 Inhaler        Inhale 2 puffs into the lungs every 4 hours as needed for shortness of breath / dyspnea or wheezing   Refills:  11        aspirin-acetaminophen-caffeine 250-250-65 MG per tablet   Commonly known as:  EXCEDRIN MIGRAINE   Dose:  1 tablet   Quantity:  24 tablet        Take 1 tablet by mouth every 6 hours as needed for headaches   Refills:  1        B-12 1000 MCG Tbcr   Dose:  1000 mcg   Quantity:  100 tablet        Take 1,000 mcg by mouth daily   Refills:  1        BENADRYL 25 MG tablet   Dose:  25 mg   Quantity:  56 tablet   Generic drug:  diphenhydrAMINE        Take 1 tablet (25 mg) by mouth every 6 hours as needed for itching or allergies   Refills:  0        blood glucose monitoring test strip   Commonly known as:  no brand specified   Quantity:  100 each        Use to test blood sugars 3 times daily or as directed Please give what is approved by insurance.   Refills:  0        budesonide-formoterol 160-4.5 MCG/ACT Inhaler   Commonly known as:  SYMBICORT   Dose:  2 puff   Quantity:  3 Inhaler        Inhale 2 puffs into the lungs 2 times daily   Refills:  3        citalopram 40 MG tablet   Commonly known as:  celeXA   Dose:  40 mg   Quantity:  90 tablet        Take 1 tablet (40 mg) by mouth daily   Refills:  3        ferrous gluconate 324 (38 FE) MG tablet   Commonly known as:  FERGON   Dose:  324 mg   Quantity:  100 tablet        Take 1 tablet (324 mg) by mouth daily (with breakfast)   Refills:  3        folic acid 1 MG tablet   Commonly known as:  FOLVITE   Dose:   1 mg   Quantity:  100 tablet        Take 1 tablet (1 mg) by mouth daily   Refills:  3        gabapentin 300 MG capsule   Commonly known as:  NEURONTIN   Dose:  300 mg   Quantity:  270 capsule        Take 1 capsule (300 mg) by mouth 3 times daily   Refills:  3        LORazepam 0.5 MG tablet   Commonly known as:  ATIVAN   Dose:  0.5 mg   Quantity:  60 tablet        Take 1 tablet (0.5 mg) by mouth 2 times daily as needed for anxiety   Refills:  3        methotrexate 2.5 MG tablet CHEMO   Dose:  15 mg   Quantity:  30 tablet        Take 6 tablets (15 mg) by mouth once a week   Refills:  1        omeprazole 20 MG CR capsule   Commonly known as:  priLOSEC   Dose:  40 mg   Quantity:  90 capsule        Take 40 mg by mouth daily   Refills:  1        oxyCODONE 15 MG IR tablet   Commonly known as:  ROXICODONE   Dose:  15 mg   Quantity:  120 tablet        Take 1 tablet (15 mg) by mouth every 4 hours as needed for moderate to severe pain   Refills:  0        polyethylene glycol powder   Commonly known as:  MIRALAX   Dose:  1 capful   Quantity:  510 g        Take 17 g (1 capful) by mouth daily   Refills:  1        * promethazine 25 MG tablet   Commonly known as:  PHENERGAN   Dose:  25 mg   Quantity:  20 tablet        Take 1 tablet (25 mg) by mouth every 6 hours as needed for nausea   Refills:  3        * promethazine 50 MG/ML Soln IV injection   Commonly known as:  PHENERGAN   Dose:  25 mg   Quantity:  90 mL        Inject 0.5 mLs (25 mg) into the muscle every 6 hours as needed   Refills:  11        ranitidine 150 MG tablet   Commonly known as:  ZANTAC   Dose:  150 mg   Quantity:  60 tablet        Take 1 tablet (150 mg) by mouth 2 times daily as needed for heartburn   Refills:  11        sucralfate 1 GM tablet   Commonly known as:  CARAFATE   Dose:  1 g   Quantity:  120 tablet        Take 1 tablet (1 g) by mouth 4 times daily   Refills:  11        zolpidem 10 MG tablet   Commonly known as:  AMBIEN   Dose:  10 mg   Quantity:  30  "tablet        Take 1 tablet (10 mg) by mouth nightly as needed for sleep   Refills:  3        * Notice:  This list has 2 medication(s) that are the same as other medications prescribed for you. Read the directions carefully, and ask your doctor or other care provider to review them with you.            Prescriptions were sent or printed at these locations (1 Prescription)                   [facility] - [place], [place] - [address]   [address] [address]    Telephone:  [phone]   Fax:  [phone]   Hours:                  E-Prescribed (1 of 1)         cephALEXin (KEFLEX) 500 MG capsule                Procedures and tests performed during your visit     Rapid Strep Screen      Orders Needing Specimen Collection     None      Pending Results     Date and Time Order Name Status Description    4/19/2017 1022 Rapid Strep Screen In process             Pending Culture Results     Date and Time Order Name Status Description    4/19/2017 1022 Rapid Strep Screen In process             Test Results From Your Hospital Stay        4/19/2017 10:29 AM                Thank you for choosing [facility]       Thank you for choosing [facility] for your care. Our goal is always to provide you with excellent care. Hearing back from our patients is one way we can continue to improve our services. Please take a few minutes to complete the written survey that you may receive in the mail after you visit with us. Thank you!        [employer] Information     [employer] lets you send messages to your doctor, view your test results, renew your prescriptions, schedule appointments and more. To sign up, go to [email or web address] . Click on \"Log in\" on the left side of the screen, which will take you to the Welcome page. Then click on \"Sign up Now\" on the right side of the page.     You will be asked to enter the access code listed below, as well as some personal information. Please follow the directions to create " your username and password.     Your access code is: [email or web address]  Expires: 2017 11:32 AM     Your access code will  in 90 days. If you need help or a new code, please call your [facility] or [phone].        Care EveryWhere ID     This is your Care EveryWhere ID. This could be used by other organizations to access your [facility] medical records  [id number]        After Visit Summary       This is your record. Keep this with you and show to your community pharmacist(s) and doctor(s) at your next visit.                   independent

## 2017-06-05 ENCOUNTER — RESULT REVIEW (OUTPATIENT)
Age: 82
End: 2017-06-05

## 2017-06-05 DIAGNOSIS — K86.2 CYST OF PANCREAS: ICD-10-CM

## 2017-06-05 LAB
ALBUMIN SERPL ELPH-MCNC: 3.1 G/DL — LOW (ref 3.3–5)
ALP SERPL-CCNC: 64 U/L — SIGNIFICANT CHANGE UP (ref 40–120)
ALT FLD-CCNC: 69 U/L RC — HIGH (ref 10–45)
ANION GAP SERPL CALC-SCNC: 12 MMOL/L — SIGNIFICANT CHANGE UP (ref 5–17)
APTT BLD: 32.4 SEC — SIGNIFICANT CHANGE UP (ref 27.5–37.4)
AST SERPL-CCNC: 89 U/L — HIGH (ref 10–40)
BILIRUB SERPL-MCNC: 0.3 MG/DL — SIGNIFICANT CHANGE UP (ref 0.2–1.2)
BLD GP AB SCN SERPL QL: NEGATIVE — SIGNIFICANT CHANGE UP
BUN SERPL-MCNC: 6 MG/DL — LOW (ref 7–23)
CALCIUM SERPL-MCNC: 9.5 MG/DL — SIGNIFICANT CHANGE UP (ref 8.4–10.5)
CHLORIDE SERPL-SCNC: 107 MMOL/L — SIGNIFICANT CHANGE UP (ref 96–108)
CO2 SERPL-SCNC: 25 MMOL/L — SIGNIFICANT CHANGE UP (ref 22–31)
CREAT SERPL-MCNC: 0.8 MG/DL — SIGNIFICANT CHANGE UP (ref 0.5–1.3)
GLUCOSE SERPL-MCNC: 109 MG/DL — HIGH (ref 70–99)
HCT VFR BLD CALC: 39.6 % — SIGNIFICANT CHANGE UP (ref 39–50)
HGB BLD-MCNC: 13.1 G/DL — SIGNIFICANT CHANGE UP (ref 13–17)
INR BLD: 1.13 RATIO — SIGNIFICANT CHANGE UP (ref 0.88–1.16)
MAGNESIUM SERPL-MCNC: 1.9 MG/DL — SIGNIFICANT CHANGE UP (ref 1.6–2.6)
MCHC RBC-ENTMCNC: 31 PG — SIGNIFICANT CHANGE UP (ref 27–34)
MCHC RBC-ENTMCNC: 33.1 GM/DL — SIGNIFICANT CHANGE UP (ref 32–36)
MCV RBC AUTO: 93.7 FL — SIGNIFICANT CHANGE UP (ref 80–100)
PHOSPHATE SERPL-MCNC: 2.7 MG/DL — SIGNIFICANT CHANGE UP (ref 2.5–4.5)
PLATELET # BLD AUTO: 188 K/UL — SIGNIFICANT CHANGE UP (ref 150–400)
POTASSIUM SERPL-MCNC: 4.1 MMOL/L — SIGNIFICANT CHANGE UP (ref 3.5–5.3)
POTASSIUM SERPL-SCNC: 4.1 MMOL/L — SIGNIFICANT CHANGE UP (ref 3.5–5.3)
PROT SERPL-MCNC: 6.4 G/DL — SIGNIFICANT CHANGE UP (ref 6–8.3)
PROTHROM AB SERPL-ACNC: 12.3 SEC — SIGNIFICANT CHANGE UP (ref 9.8–12.7)
RBC # BLD: 4.23 M/UL — SIGNIFICANT CHANGE UP (ref 4.2–5.8)
RBC # FLD: 12.8 % — SIGNIFICANT CHANGE UP (ref 10.3–14.5)
RH IG SCN BLD-IMP: POSITIVE — SIGNIFICANT CHANGE UP
SODIUM SERPL-SCNC: 144 MMOL/L — SIGNIFICANT CHANGE UP (ref 135–145)
WBC # BLD: 5.9 K/UL — SIGNIFICANT CHANGE UP (ref 3.8–10.5)
WBC # FLD AUTO: 5.9 K/UL — SIGNIFICANT CHANGE UP (ref 3.8–10.5)

## 2017-06-05 PROCEDURE — 88305 TISSUE EXAM BY PATHOLOGIST: CPT | Mod: 26

## 2017-06-05 PROCEDURE — 99233 SBSQ HOSP IP/OBS HIGH 50: CPT | Mod: GC

## 2017-06-05 PROCEDURE — 88173 CYTOPATH EVAL FNA REPORT: CPT | Mod: 26

## 2017-06-05 PROCEDURE — 99222 1ST HOSP IP/OBS MODERATE 55: CPT

## 2017-06-05 PROCEDURE — 43242 EGD US FINE NEEDLE BX/ASPIR: CPT | Mod: GC

## 2017-06-05 RX ORDER — PANTOPRAZOLE SODIUM 20 MG/1
40 TABLET, DELAYED RELEASE ORAL DAILY
Qty: 0 | Refills: 0 | Status: DISCONTINUED | OUTPATIENT
Start: 2017-06-05 | End: 2017-06-05

## 2017-06-05 RX ADMIN — HEPARIN SODIUM 5000 UNIT(S): 5000 INJECTION INTRAVENOUS; SUBCUTANEOUS at 21:32

## 2017-06-05 RX ADMIN — Medication 650 MILLIGRAM(S): at 21:33

## 2017-06-05 RX ADMIN — Medication 200 MILLIGRAM(S): at 06:44

## 2017-06-05 RX ADMIN — PANTOPRAZOLE SODIUM 40 MILLIGRAM(S): 20 TABLET, DELAYED RELEASE ORAL at 04:55

## 2017-06-05 RX ADMIN — HEPARIN SODIUM 5000 UNIT(S): 5000 INJECTION INTRAVENOUS; SUBCUTANEOUS at 16:23

## 2017-06-05 RX ADMIN — Medication 650 MILLIGRAM(S): at 22:12

## 2017-06-05 RX ADMIN — Medication 100 MILLIGRAM(S): at 05:24

## 2017-06-05 RX ADMIN — SIMVASTATIN 10 MILLIGRAM(S): 20 TABLET, FILM COATED ORAL at 21:33

## 2017-06-05 RX ADMIN — Medication 1 TABLET(S): at 23:30

## 2017-06-05 RX ADMIN — Medication 1000 UNIT(S): at 16:23

## 2017-06-06 VITALS
RESPIRATION RATE: 18 BRPM | DIASTOLIC BLOOD PRESSURE: 76 MMHG | OXYGEN SATURATION: 96 % | TEMPERATURE: 98 F | SYSTOLIC BLOOD PRESSURE: 132 MMHG | HEART RATE: 72 BPM

## 2017-06-06 LAB
ALBUMIN SERPL ELPH-MCNC: 3.4 G/DL — SIGNIFICANT CHANGE UP (ref 3.3–5)
ALBUMIN SERPL ELPH-MCNC: 3.5 G/DL — SIGNIFICANT CHANGE UP (ref 3.3–5)
ALP SERPL-CCNC: 68 U/L — SIGNIFICANT CHANGE UP (ref 40–120)
ALP SERPL-CCNC: 71 U/L — SIGNIFICANT CHANGE UP (ref 40–120)
ALT FLD-CCNC: 107 U/L RC — HIGH (ref 10–45)
ALT FLD-CCNC: 117 U/L RC — HIGH (ref 10–45)
ANION GAP SERPL CALC-SCNC: 10 MMOL/L — SIGNIFICANT CHANGE UP (ref 5–17)
ANION GAP SERPL CALC-SCNC: 12 MMOL/L — SIGNIFICANT CHANGE UP (ref 5–17)
AST SERPL-CCNC: 119 U/L — HIGH (ref 10–40)
AST SERPL-CCNC: 119 U/L — HIGH (ref 10–40)
BILIRUB SERPL-MCNC: 0.3 MG/DL — SIGNIFICANT CHANGE UP (ref 0.2–1.2)
BILIRUB SERPL-MCNC: 0.3 MG/DL — SIGNIFICANT CHANGE UP (ref 0.2–1.2)
BUN SERPL-MCNC: 10 MG/DL — SIGNIFICANT CHANGE UP (ref 7–23)
BUN SERPL-MCNC: 11 MG/DL — SIGNIFICANT CHANGE UP (ref 7–23)
CALCIUM SERPL-MCNC: 9.5 MG/DL — SIGNIFICANT CHANGE UP (ref 8.4–10.5)
CALCIUM SERPL-MCNC: 9.7 MG/DL — SIGNIFICANT CHANGE UP (ref 8.4–10.5)
CHLORIDE SERPL-SCNC: 105 MMOL/L — SIGNIFICANT CHANGE UP (ref 96–108)
CHLORIDE SERPL-SCNC: 108 MMOL/L — SIGNIFICANT CHANGE UP (ref 96–108)
CO2 SERPL-SCNC: 25 MMOL/L — SIGNIFICANT CHANGE UP (ref 22–31)
CO2 SERPL-SCNC: 26 MMOL/L — SIGNIFICANT CHANGE UP (ref 22–31)
CREAT SERPL-MCNC: 0.79 MG/DL — SIGNIFICANT CHANGE UP (ref 0.5–1.3)
CREAT SERPL-MCNC: 0.83 MG/DL — SIGNIFICANT CHANGE UP (ref 0.5–1.3)
GLUCOSE SERPL-MCNC: 101 MG/DL — HIGH (ref 70–99)
GLUCOSE SERPL-MCNC: 137 MG/DL — HIGH (ref 70–99)
POTASSIUM SERPL-MCNC: 3.9 MMOL/L — SIGNIFICANT CHANGE UP (ref 3.5–5.3)
POTASSIUM SERPL-MCNC: 4.3 MMOL/L — SIGNIFICANT CHANGE UP (ref 3.5–5.3)
POTASSIUM SERPL-SCNC: 3.9 MMOL/L — SIGNIFICANT CHANGE UP (ref 3.5–5.3)
POTASSIUM SERPL-SCNC: 4.3 MMOL/L — SIGNIFICANT CHANGE UP (ref 3.5–5.3)
PROT SERPL-MCNC: 6.3 G/DL — SIGNIFICANT CHANGE UP (ref 6–8.3)
PROT SERPL-MCNC: 6.7 G/DL — SIGNIFICANT CHANGE UP (ref 6–8.3)
SODIUM SERPL-SCNC: 141 MMOL/L — SIGNIFICANT CHANGE UP (ref 135–145)
SODIUM SERPL-SCNC: 145 MMOL/L — SIGNIFICANT CHANGE UP (ref 135–145)

## 2017-06-06 PROCEDURE — 97161 PT EVAL LOW COMPLEX 20 MIN: CPT

## 2017-06-06 PROCEDURE — 83605 ASSAY OF LACTIC ACID: CPT

## 2017-06-06 PROCEDURE — 86901 BLOOD TYPING SEROLOGIC RH(D): CPT

## 2017-06-06 PROCEDURE — 80061 LIPID PANEL: CPT

## 2017-06-06 PROCEDURE — 86301 IMMUNOASSAY TUMOR CA 19-9: CPT

## 2017-06-06 PROCEDURE — 71046 X-RAY EXAM CHEST 2 VIEWS: CPT

## 2017-06-06 PROCEDURE — 99285 EMERGENCY DEPT VISIT HI MDM: CPT | Mod: 25

## 2017-06-06 PROCEDURE — 85014 HEMATOCRIT: CPT

## 2017-06-06 PROCEDURE — 96375 TX/PRO/DX INJ NEW DRUG ADDON: CPT

## 2017-06-06 PROCEDURE — 85730 THROMBOPLASTIN TIME PARTIAL: CPT

## 2017-06-06 PROCEDURE — 88172 CYTP DX EVAL FNA 1ST EA SITE: CPT

## 2017-06-06 PROCEDURE — 74177 CT ABD & PELVIS W/CONTRAST: CPT

## 2017-06-06 PROCEDURE — 81001 URINALYSIS AUTO W/SCOPE: CPT

## 2017-06-06 PROCEDURE — 84132 ASSAY OF SERUM POTASSIUM: CPT

## 2017-06-06 PROCEDURE — 96374 THER/PROPH/DIAG INJ IV PUSH: CPT | Mod: XU

## 2017-06-06 PROCEDURE — 86900 BLOOD TYPING SEROLOGIC ABO: CPT

## 2017-06-06 PROCEDURE — 84295 ASSAY OF SERUM SODIUM: CPT

## 2017-06-06 PROCEDURE — 80053 COMPREHEN METABOLIC PANEL: CPT

## 2017-06-06 PROCEDURE — 99232 SBSQ HOSP IP/OBS MODERATE 35: CPT

## 2017-06-06 PROCEDURE — 85027 COMPLETE CBC AUTOMATED: CPT

## 2017-06-06 PROCEDURE — 93005 ELECTROCARDIOGRAM TRACING: CPT

## 2017-06-06 PROCEDURE — 88173 CYTOPATH EVAL FNA REPORT: CPT

## 2017-06-06 PROCEDURE — 84100 ASSAY OF PHOSPHORUS: CPT

## 2017-06-06 PROCEDURE — 83690 ASSAY OF LIPASE: CPT

## 2017-06-06 PROCEDURE — 99239 HOSP IP/OBS DSCHRG MGMT >30: CPT

## 2017-06-06 PROCEDURE — 83735 ASSAY OF MAGNESIUM: CPT

## 2017-06-06 PROCEDURE — 86850 RBC ANTIBODY SCREEN: CPT

## 2017-06-06 PROCEDURE — 82330 ASSAY OF CALCIUM: CPT

## 2017-06-06 PROCEDURE — 82435 ASSAY OF BLOOD CHLORIDE: CPT

## 2017-06-06 PROCEDURE — 85610 PROTHROMBIN TIME: CPT

## 2017-06-06 PROCEDURE — 82803 BLOOD GASES ANY COMBINATION: CPT

## 2017-06-06 PROCEDURE — 82947 ASSAY GLUCOSE BLOOD QUANT: CPT

## 2017-06-06 PROCEDURE — 88305 TISSUE EXAM BY PATHOLOGIST: CPT

## 2017-06-06 RX ORDER — SIMVASTATIN 20 MG/1
1 TABLET, FILM COATED ORAL
Qty: 0 | Refills: 0 | COMMUNITY

## 2017-06-06 RX ADMIN — HEPARIN SODIUM 5000 UNIT(S): 5000 INJECTION INTRAVENOUS; SUBCUTANEOUS at 06:13

## 2017-06-06 RX ADMIN — Medication 1000 UNIT(S): at 11:22

## 2017-06-06 RX ADMIN — Medication 1 TABLET(S): at 08:45

## 2017-06-07 PROBLEM — E78.5 HYPERLIPIDEMIA, UNSPECIFIED: Chronic | Status: ACTIVE | Noted: 2017-06-01

## 2017-06-08 ENCOUNTER — APPOINTMENT (OUTPATIENT)
Dept: INTERNAL MEDICINE | Facility: CLINIC | Age: 82
End: 2017-06-08

## 2017-06-08 VITALS
SYSTOLIC BLOOD PRESSURE: 145 MMHG | WEIGHT: 139 LBS | DIASTOLIC BLOOD PRESSURE: 75 MMHG | RESPIRATION RATE: 12 BRPM | HEART RATE: 71 BPM | TEMPERATURE: 98 F | HEIGHT: 65 IN | OXYGEN SATURATION: 96 % | BODY MASS INDEX: 23.16 KG/M2

## 2017-06-08 DIAGNOSIS — K57.32 DIVERTICULITIS OF LARGE INTESTINE W/OUT PERFORATION OR ABSCESS W/OUT BLEEDING: ICD-10-CM

## 2017-06-08 RX ORDER — CIPROFLOXACIN HYDROCHLORIDE 500 MG/1
500 TABLET, FILM COATED ORAL
Qty: 5 | Refills: 0 | Status: DISCONTINUED | COMMUNITY
Start: 2017-06-05

## 2017-06-08 RX ORDER — UBIDECARENONE 100 MG
100 CAPSULE ORAL
Refills: 0 | Status: COMPLETED | COMMUNITY
Start: 2017-04-24 | End: 2017-06-08

## 2017-06-09 LAB
ALBUMIN SERPL ELPH-MCNC: 4.2 G/DL
ALP BLD-CCNC: 76 U/L
ALT SERPL-CCNC: 115 U/L
AMYLASE/CREAT SERPL: 68 U/L
ANION GAP SERPL CALC-SCNC: 18 MMOL/L
AST SERPL-CCNC: 79 U/L
BASOPHILS # BLD AUTO: 0.04 K/UL
BASOPHILS NFR BLD AUTO: 0.6 %
BILIRUB SERPL-MCNC: 0.3 MG/DL
BUN SERPL-MCNC: 14 MG/DL
CALCIUM SERPL-MCNC: 9.9 MG/DL
CHLORIDE SERPL-SCNC: 105 MMOL/L
CO2 SERPL-SCNC: 21 MMOL/L
CREAT SERPL-MCNC: 0.91 MG/DL
EOSINOPHIL # BLD AUTO: 0.09 K/UL
EOSINOPHIL NFR BLD AUTO: 1.3 %
GGT SERPL-CCNC: 22 U/L
GLUCOSE SERPL-MCNC: 108 MG/DL
HCT VFR BLD CALC: 42 %
HGB BLD-MCNC: 13.5 G/DL
IMM GRANULOCYTES NFR BLD AUTO: 0.9 %
LPL SERPL-CCNC: 33 U/L
LYMPHOCYTES # BLD AUTO: 2.39 K/UL
LYMPHOCYTES NFR BLD AUTO: 35 %
MAN DIFF?: NORMAL
MCHC RBC-ENTMCNC: 29.9 PG
MCHC RBC-ENTMCNC: 32.1 GM/DL
MCV RBC AUTO: 93.1 FL
MONOCYTES # BLD AUTO: 0.72 K/UL
MONOCYTES NFR BLD AUTO: 10.6 %
NEUTROPHILS # BLD AUTO: 3.52 K/UL
NEUTROPHILS NFR BLD AUTO: 51.6 %
NON-GYNECOLOGICAL CYTOLOGY STUDY: SIGNIFICANT CHANGE UP
PLATELET # BLD AUTO: 260 K/UL
POTASSIUM SERPL-SCNC: 4.7 MMOL/L
PROT SERPL-MCNC: 7 G/DL
RBC # BLD: 4.51 M/UL
RBC # FLD: 14.9 %
SODIUM SERPL-SCNC: 144 MMOL/L
WBC # FLD AUTO: 6.82 K/UL

## 2017-06-12 ENCOUNTER — APPOINTMENT (OUTPATIENT)
Dept: SURGICAL ONCOLOGY | Facility: CLINIC | Age: 82
End: 2017-06-12

## 2017-06-12 VITALS
BODY MASS INDEX: 22.88 KG/M2 | WEIGHT: 134 LBS | DIASTOLIC BLOOD PRESSURE: 76 MMHG | RESPIRATION RATE: 15 BRPM | HEIGHT: 64 IN | HEART RATE: 76 BPM | SYSTOLIC BLOOD PRESSURE: 143 MMHG

## 2017-06-14 ENCOUNTER — APPOINTMENT (OUTPATIENT)
Dept: CT IMAGING | Facility: IMAGING CENTER | Age: 82
End: 2017-06-14

## 2017-06-19 ENCOUNTER — OUTPATIENT (OUTPATIENT)
Dept: OUTPATIENT SERVICES | Facility: HOSPITAL | Age: 82
LOS: 1 days | Discharge: HOME | End: 2017-06-19

## 2017-06-19 DIAGNOSIS — C25.9 MALIGNANT NEOPLASM OF PANCREAS, UNSPECIFIED: ICD-10-CM

## 2017-06-22 ENCOUNTER — APPOINTMENT (OUTPATIENT)
Dept: HEMATOLOGY ONCOLOGY | Facility: CLINIC | Age: 82
End: 2017-06-22

## 2017-06-22 VITALS
DIASTOLIC BLOOD PRESSURE: 71 MMHG | HEART RATE: 80 BPM | BODY MASS INDEX: 22.33 KG/M2 | HEIGHT: 65.04 IN | TEMPERATURE: 98.4 F | OXYGEN SATURATION: 98 % | RESPIRATION RATE: 16 BRPM | WEIGHT: 134.04 LBS | SYSTOLIC BLOOD PRESSURE: 126 MMHG

## 2017-06-22 DIAGNOSIS — N40.0 BENIGN PROSTATIC HYPERPLASIA WITHOUT LOWER URINARY TRACT SYMPMS: ICD-10-CM

## 2017-06-22 DIAGNOSIS — K76.9 LIVER DISEASE, UNSPECIFIED: ICD-10-CM

## 2017-06-22 DIAGNOSIS — Z86.39 PERSONAL HISTORY OF OTHER ENDOCRINE, NUTRITIONAL AND METABOLIC DISEASE: ICD-10-CM

## 2017-06-24 PROBLEM — N40.0 BPH (BENIGN PROSTATIC HYPERPLASIA): Status: RESOLVED | Noted: 2017-06-24 | Resolved: 2017-06-24

## 2017-06-24 PROBLEM — Z86.39 HISTORY OF HYPERLIPIDEMIA: Status: RESOLVED | Noted: 2017-06-24 | Resolved: 2017-06-24

## 2017-06-27 ENCOUNTER — APPOINTMENT (OUTPATIENT)
Dept: SURGICAL ONCOLOGY | Facility: CLINIC | Age: 82
End: 2017-06-27

## 2017-06-27 VITALS
BODY MASS INDEX: 22.49 KG/M2 | DIASTOLIC BLOOD PRESSURE: 78 MMHG | RESPIRATION RATE: 15 BRPM | HEIGHT: 65 IN | OXYGEN SATURATION: 97 % | SYSTOLIC BLOOD PRESSURE: 135 MMHG | WEIGHT: 135 LBS | HEART RATE: 67 BPM

## 2017-06-29 PROBLEM — K76.9 LIVER LESION: Status: ACTIVE | Noted: 2017-06-29

## 2017-07-10 ENCOUNTER — FORM ENCOUNTER (OUTPATIENT)
Age: 82
End: 2017-07-10

## 2017-07-11 ENCOUNTER — APPOINTMENT (OUTPATIENT)
Dept: NUCLEAR MEDICINE | Facility: IMAGING CENTER | Age: 82
End: 2017-07-11

## 2017-07-11 ENCOUNTER — OUTPATIENT (OUTPATIENT)
Dept: OUTPATIENT SERVICES | Facility: HOSPITAL | Age: 82
LOS: 1 days | End: 2017-07-11
Payer: MEDICARE

## 2017-07-11 DIAGNOSIS — C25.9 MALIGNANT NEOPLASM OF PANCREAS, UNSPECIFIED: ICD-10-CM

## 2017-07-11 DIAGNOSIS — K76.9 LIVER DISEASE, UNSPECIFIED: ICD-10-CM

## 2017-07-11 PROCEDURE — A9552: CPT

## 2017-07-11 PROCEDURE — 78815 PET IMAGE W/CT SKULL-THIGH: CPT

## 2017-07-18 ENCOUNTER — OUTPATIENT (OUTPATIENT)
Dept: OUTPATIENT SERVICES | Facility: HOSPITAL | Age: 82
LOS: 1 days | Discharge: HOME | End: 2017-07-18

## 2017-07-18 DIAGNOSIS — C25.9 MALIGNANT NEOPLASM OF PANCREAS, UNSPECIFIED: ICD-10-CM

## 2017-07-20 ENCOUNTER — RESULT REVIEW (OUTPATIENT)
Age: 82
End: 2017-07-20

## 2017-07-20 ENCOUNTER — APPOINTMENT (OUTPATIENT)
Dept: HEMATOLOGY ONCOLOGY | Facility: CLINIC | Age: 82
End: 2017-07-20

## 2017-07-20 VITALS
WEIGHT: 136.69 LBS | DIASTOLIC BLOOD PRESSURE: 77 MMHG | RESPIRATION RATE: 16 BRPM | OXYGEN SATURATION: 98 % | SYSTOLIC BLOOD PRESSURE: 124 MMHG | HEIGHT: 65 IN | HEART RATE: 65 BPM | BODY MASS INDEX: 22.77 KG/M2 | TEMPERATURE: 98.5 F

## 2017-07-20 LAB
BASOPHILS # BLD AUTO: 0 K/UL — SIGNIFICANT CHANGE UP (ref 0–0.2)
BASOPHILS NFR BLD AUTO: 0.3 % — SIGNIFICANT CHANGE UP (ref 0–2)
EOSINOPHIL # BLD AUTO: 0.1 K/UL — SIGNIFICANT CHANGE UP (ref 0–0.5)
EOSINOPHIL NFR BLD AUTO: 2 % — SIGNIFICANT CHANGE UP (ref 0–6)
HCT VFR BLD CALC: 42.4 % — SIGNIFICANT CHANGE UP (ref 39–50)
HGB BLD-MCNC: 14.4 G/DL — SIGNIFICANT CHANGE UP (ref 13–17)
LYMPHOCYTES # BLD AUTO: 3 K/UL — SIGNIFICANT CHANGE UP (ref 1–3.3)
LYMPHOCYTES # BLD AUTO: 42.7 % — SIGNIFICANT CHANGE UP (ref 13–44)
MCHC RBC-ENTMCNC: 30.7 PG — SIGNIFICANT CHANGE UP (ref 27–34)
MCHC RBC-ENTMCNC: 34 G/DL — SIGNIFICANT CHANGE UP (ref 32–36)
MCV RBC AUTO: 90.4 FL — SIGNIFICANT CHANGE UP (ref 80–100)
MONOCYTES # BLD AUTO: 0.5 K/UL — SIGNIFICANT CHANGE UP (ref 0–0.9)
MONOCYTES NFR BLD AUTO: 7.4 % — SIGNIFICANT CHANGE UP (ref 2–14)
NEUTROPHILS # BLD AUTO: 3.4 K/UL — SIGNIFICANT CHANGE UP (ref 1.8–7.4)
NEUTROPHILS NFR BLD AUTO: 47.5 % — SIGNIFICANT CHANGE UP (ref 43–77)
PLATELET # BLD AUTO: 172 K/UL — SIGNIFICANT CHANGE UP (ref 150–400)
RBC # BLD: 4.69 M/UL — SIGNIFICANT CHANGE UP (ref 4.2–5.8)
RBC # FLD: 14.5 % — SIGNIFICANT CHANGE UP (ref 10.3–14.5)
WBC # BLD: 7.1 K/UL — SIGNIFICANT CHANGE UP (ref 3.8–10.5)
WBC # FLD AUTO: 7.1 K/UL — SIGNIFICANT CHANGE UP (ref 3.8–10.5)

## 2017-07-21 ENCOUNTER — OUTPATIENT (OUTPATIENT)
Dept: OUTPATIENT SERVICES | Facility: HOSPITAL | Age: 82
LOS: 1 days | Discharge: HOME | End: 2017-07-21

## 2017-07-26 ENCOUNTER — APPOINTMENT (OUTPATIENT)
Dept: RADIATION ONCOLOGY | Facility: CLINIC | Age: 82
End: 2017-07-26

## 2017-07-26 VITALS
BODY MASS INDEX: 22.85 KG/M2 | HEART RATE: 69 BPM | RESPIRATION RATE: 16 BRPM | TEMPERATURE: 96.7 F | OXYGEN SATURATION: 98 % | DIASTOLIC BLOOD PRESSURE: 78 MMHG | HEIGHT: 65 IN | SYSTOLIC BLOOD PRESSURE: 138 MMHG | WEIGHT: 137.13 LBS

## 2017-08-08 ENCOUNTER — APPOINTMENT (OUTPATIENT)
Dept: INTERNAL MEDICINE | Facility: CLINIC | Age: 82
End: 2017-08-08
Payer: MEDICARE

## 2017-08-08 VITALS
RESPIRATION RATE: 12 BRPM | SYSTOLIC BLOOD PRESSURE: 138 MMHG | OXYGEN SATURATION: 95 % | BODY MASS INDEX: 22.82 KG/M2 | TEMPERATURE: 98.5 F | HEIGHT: 65 IN | DIASTOLIC BLOOD PRESSURE: 85 MMHG | HEART RATE: 68 BPM | WEIGHT: 137 LBS

## 2017-08-08 PROCEDURE — 99214 OFFICE O/P EST MOD 30 MIN: CPT

## 2017-08-08 RX ORDER — UBIDECARENONE/VIT E ACET 100MG-5
100 CAPSULE ORAL
Refills: 0 | Status: ACTIVE | COMMUNITY

## 2017-08-08 RX ORDER — DONEPEZIL HYDROCHLORIDE 5 MG/1
5 TABLET ORAL DAILY
Qty: 30 | Refills: 3 | Status: DISCONTINUED | COMMUNITY
Start: 2017-04-24 | End: 2017-08-08

## 2017-08-08 RX ORDER — DOCUSATE SODIUM 100 MG/1
100 CAPSULE ORAL
Qty: 270 | Refills: 1 | Status: DISCONTINUED | COMMUNITY
Start: 2017-06-08 | End: 2017-08-08

## 2017-08-11 ENCOUNTER — OUTPATIENT (OUTPATIENT)
Dept: OUTPATIENT SERVICES | Facility: HOSPITAL | Age: 82
LOS: 1 days | Discharge: HOME | End: 2017-08-11
Payer: MEDICARE

## 2017-08-11 PROCEDURE — 77295 3-D RADIOTHERAPY PLAN: CPT | Mod: 26

## 2017-08-11 PROCEDURE — 77300 RADIATION THERAPY DOSE PLAN: CPT | Mod: 26

## 2017-08-11 PROCEDURE — 77334 RADIATION TREATMENT AID(S): CPT | Mod: 26

## 2017-08-15 LAB
25(OH)D3 SERPL-MCNC: 33.2 NG/ML
ALBUMIN SERPL ELPH-MCNC: 4.2 G/DL
ALP BLD-CCNC: 79 U/L
ALT SERPL-CCNC: 16 U/L
AMYLASE/CREAT SERPL: 93 U/L
ANION GAP SERPL CALC-SCNC: 16 MMOL/L
AST SERPL-CCNC: 17 U/L
BILIRUB SERPL-MCNC: 0.4 MG/DL
BUN SERPL-MCNC: 14 MG/DL
CALCIUM SERPL-MCNC: 10 MG/DL
CANCER AG19-9 SERPL-ACNC: 1144.6 U/ML
CEA SERPL-MCNC: 6.7 NG/ML
CHLORIDE SERPL-SCNC: 103 MMOL/L
CHOLEST SERPL-MCNC: 213 MG/DL
CHOLEST/HDLC SERPL: 5.1 RATIO
CO2 SERPL-SCNC: 23 MMOL/L
CREAT SERPL-MCNC: 0.89 MG/DL
GLUCOSE SERPL-MCNC: 115 MG/DL
HDLC SERPL-MCNC: 42 MG/DL
LDLC SERPL CALC-MCNC: 136 MG/DL
LPL SERPL-CCNC: 31 U/L
POTASSIUM SERPL-SCNC: 4.8 MMOL/L
PROT SERPL-MCNC: 7.6 G/DL
SODIUM SERPL-SCNC: 142 MMOL/L
TRIGL SERPL-MCNC: 176 MG/DL

## 2017-08-16 VITALS
WEIGHT: 137 LBS | HEIGHT: 65 IN | SYSTOLIC BLOOD PRESSURE: 168 MMHG | DIASTOLIC BLOOD PRESSURE: 84 MMHG | RESPIRATION RATE: 12 BRPM | BODY MASS INDEX: 22.82 KG/M2 | HEART RATE: 65 BPM | OXYGEN SATURATION: 98 %

## 2017-08-16 LAB
BASOPHILS # BLD AUTO: 0.02 K/UL
BASOPHILS NFR BLD AUTO: 0.3 %
EOSINOPHIL # BLD AUTO: 0.16 K/UL
EOSINOPHIL NFR BLD AUTO: 2.1 %
HCT VFR BLD CALC: 44.8 %
HGB BLD-MCNC: 14.6 G/DL
IMM GRANULOCYTES NFR BLD AUTO: 0.3 %
LYMPHOCYTES # BLD AUTO: 2.58 K/UL
LYMPHOCYTES NFR BLD AUTO: 33.8 %
MAN DIFF?: NORMAL
MCHC RBC-ENTMCNC: 30 PG
MCHC RBC-ENTMCNC: 32.6 GM/DL
MCV RBC AUTO: 92 FL
MONOCYTES # BLD AUTO: 0.68 K/UL
MONOCYTES NFR BLD AUTO: 8.9 %
NEUTROPHILS # BLD AUTO: 4.18 K/UL
NEUTROPHILS NFR BLD AUTO: 54.6 %
PLATELET # BLD AUTO: 173 K/UL
RBC # BLD: 4.87 M/UL
RBC # FLD: 15.3 %
WBC # FLD AUTO: 7.64 K/UL

## 2017-08-18 PROCEDURE — 77435 SBRT MANAGEMENT: CPT

## 2017-08-24 ENCOUNTER — OUTPATIENT (OUTPATIENT)
Dept: OUTPATIENT SERVICES | Facility: HOSPITAL | Age: 82
LOS: 1 days | Discharge: HOME | End: 2017-08-24

## 2017-08-24 DIAGNOSIS — C25.9 MALIGNANT NEOPLASM OF PANCREAS, UNSPECIFIED: ICD-10-CM

## 2017-08-30 ENCOUNTER — RESULT REVIEW (OUTPATIENT)
Age: 82
End: 2017-08-30

## 2017-08-30 ENCOUNTER — APPOINTMENT (OUTPATIENT)
Dept: HEMATOLOGY ONCOLOGY | Facility: CLINIC | Age: 82
End: 2017-08-30
Payer: MEDICARE

## 2017-08-30 VITALS
TEMPERATURE: 98 F | WEIGHT: 136.69 LBS | SYSTOLIC BLOOD PRESSURE: 120 MMHG | DIASTOLIC BLOOD PRESSURE: 72 MMHG | RESPIRATION RATE: 16 BRPM | BODY MASS INDEX: 22.75 KG/M2 | OXYGEN SATURATION: 99 % | HEART RATE: 75 BPM

## 2017-08-30 LAB
BASOPHILS # BLD AUTO: 0.1 K/UL — SIGNIFICANT CHANGE UP (ref 0–0.2)
BASOPHILS NFR BLD AUTO: 1.3 % — SIGNIFICANT CHANGE UP (ref 0–2)
EOSINOPHIL # BLD AUTO: 0.1 K/UL — SIGNIFICANT CHANGE UP (ref 0–0.5)
EOSINOPHIL NFR BLD AUTO: 2.4 % — SIGNIFICANT CHANGE UP (ref 0–6)
HCT VFR BLD CALC: 44.2 % — SIGNIFICANT CHANGE UP (ref 39–50)
HGB BLD-MCNC: 15.4 G/DL — SIGNIFICANT CHANGE UP (ref 13–17)
LYMPHOCYTES # BLD AUTO: 2.2 K/UL — SIGNIFICANT CHANGE UP (ref 1–3.3)
LYMPHOCYTES # BLD AUTO: 37.1 % — SIGNIFICANT CHANGE UP (ref 13–44)
MCHC RBC-ENTMCNC: 31.7 PG — SIGNIFICANT CHANGE UP (ref 27–34)
MCHC RBC-ENTMCNC: 34.8 G/DL — SIGNIFICANT CHANGE UP (ref 32–36)
MCV RBC AUTO: 91.2 FL — SIGNIFICANT CHANGE UP (ref 80–100)
MONOCYTES # BLD AUTO: 0.6 K/UL — SIGNIFICANT CHANGE UP (ref 0–0.9)
MONOCYTES NFR BLD AUTO: 10.1 % — SIGNIFICANT CHANGE UP (ref 2–14)
NEUTROPHILS # BLD AUTO: 3 K/UL — SIGNIFICANT CHANGE UP (ref 1.8–7.4)
NEUTROPHILS NFR BLD AUTO: 49.1 % — SIGNIFICANT CHANGE UP (ref 43–77)
PLATELET # BLD AUTO: 178 K/UL — SIGNIFICANT CHANGE UP (ref 150–400)
RBC # BLD: 4.85 M/UL — SIGNIFICANT CHANGE UP (ref 4.2–5.8)
RBC # FLD: 13 % — SIGNIFICANT CHANGE UP (ref 10.3–14.5)
WBC # BLD: 6.1 K/UL — SIGNIFICANT CHANGE UP (ref 3.8–10.5)
WBC # FLD AUTO: 6.1 K/UL — SIGNIFICANT CHANGE UP (ref 3.8–10.5)

## 2017-08-30 PROCEDURE — 99214 OFFICE O/P EST MOD 30 MIN: CPT

## 2017-09-05 ENCOUNTER — APPOINTMENT (OUTPATIENT)
Dept: SURGICAL ONCOLOGY | Facility: CLINIC | Age: 82
End: 2017-09-05

## 2017-09-15 ENCOUNTER — APPOINTMENT (OUTPATIENT)
Dept: INTERNAL MEDICINE | Facility: CLINIC | Age: 82
End: 2017-09-15

## 2017-09-19 ENCOUNTER — APPOINTMENT (OUTPATIENT)
Dept: RADIATION ONCOLOGY | Facility: CLINIC | Age: 82
End: 2017-09-19
Payer: MEDICARE

## 2017-09-19 ENCOUNTER — APPOINTMENT (OUTPATIENT)
Dept: NEUROLOGY | Facility: CLINIC | Age: 82
End: 2017-09-19
Payer: MEDICARE

## 2017-09-19 ENCOUNTER — APPOINTMENT (OUTPATIENT)
Dept: NEUROLOGY | Facility: CLINIC | Age: 82
End: 2017-09-19

## 2017-09-19 VITALS
BODY MASS INDEX: 22.63 KG/M2 | WEIGHT: 136 LBS | HEART RATE: 70 BPM | SYSTOLIC BLOOD PRESSURE: 132 MMHG | DIASTOLIC BLOOD PRESSURE: 77 MMHG | RESPIRATION RATE: 16 BRPM

## 2017-09-19 VITALS — SYSTOLIC BLOOD PRESSURE: 140 MMHG | HEIGHT: 65 IN | DIASTOLIC BLOOD PRESSURE: 80 MMHG | BODY MASS INDEX: 22.63 KG/M2

## 2017-09-19 PROCEDURE — 99214 OFFICE O/P EST MOD 30 MIN: CPT

## 2017-09-19 PROCEDURE — 99024 POSTOP FOLLOW-UP VISIT: CPT

## 2017-09-28 ENCOUNTER — OUTPATIENT (OUTPATIENT)
Dept: OUTPATIENT SERVICES | Facility: HOSPITAL | Age: 82
LOS: 1 days | Discharge: HOME | End: 2017-09-28

## 2017-09-28 ENCOUNTER — FORM ENCOUNTER (OUTPATIENT)
Age: 82
End: 2017-09-28

## 2017-09-28 DIAGNOSIS — C25.9 MALIGNANT NEOPLASM OF PANCREAS, UNSPECIFIED: ICD-10-CM

## 2017-09-29 ENCOUNTER — OUTPATIENT (OUTPATIENT)
Dept: OUTPATIENT SERVICES | Facility: HOSPITAL | Age: 82
LOS: 1 days | End: 2017-09-29
Payer: MEDICARE

## 2017-09-29 ENCOUNTER — APPOINTMENT (OUTPATIENT)
Dept: CT IMAGING | Facility: IMAGING CENTER | Age: 82
End: 2017-09-29
Payer: MEDICARE

## 2017-09-29 DIAGNOSIS — C25.9 MALIGNANT NEOPLASM OF PANCREAS, UNSPECIFIED: ICD-10-CM

## 2017-09-29 PROCEDURE — 82565 ASSAY OF CREATININE: CPT

## 2017-09-29 PROCEDURE — 74177 CT ABD & PELVIS W/CONTRAST: CPT | Mod: 26

## 2017-09-29 PROCEDURE — 71260 CT THORAX DX C+: CPT | Mod: 26

## 2017-09-29 PROCEDURE — 71260 CT THORAX DX C+: CPT

## 2017-09-29 PROCEDURE — 74177 CT ABD & PELVIS W/CONTRAST: CPT

## 2017-10-02 ENCOUNTER — APPOINTMENT (OUTPATIENT)
Dept: HEMATOLOGY ONCOLOGY | Facility: CLINIC | Age: 82
End: 2017-10-02
Payer: MEDICARE

## 2017-10-02 VITALS
DIASTOLIC BLOOD PRESSURE: 81 MMHG | SYSTOLIC BLOOD PRESSURE: 136 MMHG | HEART RATE: 70 BPM | BODY MASS INDEX: 22.78 KG/M2 | OXYGEN SATURATION: 99 % | RESPIRATION RATE: 16 BRPM | WEIGHT: 136.91 LBS | TEMPERATURE: 97.5 F

## 2017-10-02 PROCEDURE — 99214 OFFICE O/P EST MOD 30 MIN: CPT

## 2017-11-13 ENCOUNTER — OUTPATIENT (OUTPATIENT)
Dept: OUTPATIENT SERVICES | Facility: HOSPITAL | Age: 82
LOS: 1 days | Discharge: HOME | End: 2017-11-13

## 2017-11-13 DIAGNOSIS — C25.9 MALIGNANT NEOPLASM OF PANCREAS, UNSPECIFIED: ICD-10-CM

## 2017-11-15 ENCOUNTER — RESULT REVIEW (OUTPATIENT)
Age: 82
End: 2017-11-15

## 2017-11-15 ENCOUNTER — APPOINTMENT (OUTPATIENT)
Dept: HEMATOLOGY ONCOLOGY | Facility: CLINIC | Age: 82
End: 2017-11-15
Payer: MEDICARE

## 2017-11-15 VITALS
OXYGEN SATURATION: 96 % | RESPIRATION RATE: 16 BRPM | DIASTOLIC BLOOD PRESSURE: 80 MMHG | WEIGHT: 136.91 LBS | HEART RATE: 77 BPM | SYSTOLIC BLOOD PRESSURE: 146 MMHG | BODY MASS INDEX: 22.78 KG/M2 | TEMPERATURE: 97.7 F

## 2017-11-15 LAB
BASOPHILS # BLD AUTO: 0 K/UL — SIGNIFICANT CHANGE UP (ref 0–0.2)
BASOPHILS NFR BLD AUTO: 0.8 % — SIGNIFICANT CHANGE UP (ref 0–2)
EOSINOPHIL # BLD AUTO: 0.1 K/UL — SIGNIFICANT CHANGE UP (ref 0–0.5)
EOSINOPHIL NFR BLD AUTO: 2 % — SIGNIFICANT CHANGE UP (ref 0–6)
HCT VFR BLD CALC: 44.2 % — SIGNIFICANT CHANGE UP (ref 39–50)
HGB BLD-MCNC: 14.9 G/DL — SIGNIFICANT CHANGE UP (ref 13–17)
LYMPHOCYTES # BLD AUTO: 2 K/UL — SIGNIFICANT CHANGE UP (ref 1–3.3)
LYMPHOCYTES # BLD AUTO: 32.8 % — SIGNIFICANT CHANGE UP (ref 13–44)
MCHC RBC-ENTMCNC: 30.9 PG — SIGNIFICANT CHANGE UP (ref 27–34)
MCHC RBC-ENTMCNC: 33.8 G/DL — SIGNIFICANT CHANGE UP (ref 32–36)
MCV RBC AUTO: 91.3 FL — SIGNIFICANT CHANGE UP (ref 80–100)
MONOCYTES # BLD AUTO: 0.6 K/UL — SIGNIFICANT CHANGE UP (ref 0–0.9)
MONOCYTES NFR BLD AUTO: 10 % — SIGNIFICANT CHANGE UP (ref 2–14)
NEUTROPHILS # BLD AUTO: 3.3 K/UL — SIGNIFICANT CHANGE UP (ref 1.8–7.4)
NEUTROPHILS NFR BLD AUTO: 54.5 % — SIGNIFICANT CHANGE UP (ref 43–77)
PLATELET # BLD AUTO: 162 K/UL — SIGNIFICANT CHANGE UP (ref 150–400)
RBC # BLD: 4.84 M/UL — SIGNIFICANT CHANGE UP (ref 4.2–5.8)
RBC # FLD: 12.7 % — SIGNIFICANT CHANGE UP (ref 10.3–14.5)
WBC # BLD: 6 K/UL — SIGNIFICANT CHANGE UP (ref 3.8–10.5)
WBC # FLD AUTO: 6 K/UL — SIGNIFICANT CHANGE UP (ref 3.8–10.5)

## 2017-11-15 PROCEDURE — 99214 OFFICE O/P EST MOD 30 MIN: CPT

## 2017-12-04 ENCOUNTER — APPOINTMENT (OUTPATIENT)
Dept: NEUROLOGY | Facility: CLINIC | Age: 82
End: 2017-12-04

## 2017-12-04 ENCOUNTER — RX RENEWAL (OUTPATIENT)
Age: 82
End: 2017-12-04

## 2017-12-06 ENCOUNTER — APPOINTMENT (OUTPATIENT)
Dept: INTERNAL MEDICINE | Facility: CLINIC | Age: 82
End: 2017-12-06
Payer: MEDICARE

## 2017-12-06 VITALS
SYSTOLIC BLOOD PRESSURE: 143 MMHG | WEIGHT: 139 LBS | TEMPERATURE: 97.6 F | DIASTOLIC BLOOD PRESSURE: 78 MMHG | HEIGHT: 65 IN | HEART RATE: 69 BPM | BODY MASS INDEX: 23.16 KG/M2 | RESPIRATION RATE: 12 BRPM | OXYGEN SATURATION: 98 %

## 2017-12-06 VITALS — SYSTOLIC BLOOD PRESSURE: 125 MMHG | DIASTOLIC BLOOD PRESSURE: 80 MMHG

## 2017-12-06 DIAGNOSIS — E78.5 HYPERLIPIDEMIA, UNSPECIFIED: ICD-10-CM

## 2017-12-06 DIAGNOSIS — R74.8 ABNORMAL LEVELS OF OTHER SERUM ENZYMES: ICD-10-CM

## 2017-12-06 DIAGNOSIS — K59.00 CONSTIPATION, UNSPECIFIED: ICD-10-CM

## 2017-12-06 DIAGNOSIS — Z00.00 ENCOUNTER FOR GENERAL ADULT MEDICAL EXAMINATION W/OUT ABNORMAL FINDINGS: ICD-10-CM

## 2017-12-06 DIAGNOSIS — I25.10 ATHEROSCLEROTIC HEART DISEASE OF NATIVE CORONARY ARTERY W/OUT ANGINA PECTORIS: ICD-10-CM

## 2017-12-06 DIAGNOSIS — J18.9 PNEUMONIA, UNSPECIFIED ORGANISM: ICD-10-CM

## 2017-12-06 PROCEDURE — 99214 OFFICE O/P EST MOD 30 MIN: CPT

## 2017-12-06 RX ORDER — NIACIN 250 MG
250 TABLET ORAL
Refills: 0 | Status: ACTIVE | COMMUNITY

## 2017-12-06 RX ORDER — PNEUMOCOCCAL 13-VALENT CONJUGATE VACCINE 2.2; 2.2; 2.2; 2.2; 2.2; 4.4; 2.2; 2.2; 2.2; 2.2; 2.2; 2.2; 2.2 UG/.5ML; UG/.5ML; UG/.5ML; UG/.5ML; UG/.5ML; UG/.5ML; UG/.5ML; UG/.5ML; UG/.5ML; UG/.5ML; UG/.5ML; UG/.5ML; UG/.5ML
INJECTION, SUSPENSION INTRAMUSCULAR
Qty: 1 | Refills: 0 | Status: DISCONTINUED | COMMUNITY
Start: 2017-08-08 | End: 2017-12-06

## 2017-12-19 ENCOUNTER — APPOINTMENT (OUTPATIENT)
Dept: NEUROLOGY | Facility: CLINIC | Age: 82
End: 2017-12-19
Payer: MEDICARE

## 2017-12-19 VITALS
DIASTOLIC BLOOD PRESSURE: 73 MMHG | HEIGHT: 65 IN | WEIGHT: 137 LBS | HEART RATE: 72 BPM | SYSTOLIC BLOOD PRESSURE: 127 MMHG | BODY MASS INDEX: 22.82 KG/M2

## 2017-12-19 PROCEDURE — 99214 OFFICE O/P EST MOD 30 MIN: CPT

## 2018-01-08 ENCOUNTER — APPOINTMENT (OUTPATIENT)
Dept: UROLOGY | Facility: CLINIC | Age: 83
End: 2018-01-08
Payer: MEDICARE

## 2018-01-08 VITALS
OXYGEN SATURATION: 98 % | DIASTOLIC BLOOD PRESSURE: 79 MMHG | RESPIRATION RATE: 12 BRPM | HEIGHT: 65 IN | BODY MASS INDEX: 23.66 KG/M2 | WEIGHT: 142 LBS | HEART RATE: 95 BPM | SYSTOLIC BLOOD PRESSURE: 144 MMHG

## 2018-01-08 DIAGNOSIS — R03.0 ELEVATED BLOOD-PRESSURE READING, W/OUT DIAGNOSIS OF HYPERTENSION: ICD-10-CM

## 2018-01-08 DIAGNOSIS — K86.2 CYST OF PANCREAS: ICD-10-CM

## 2018-01-08 DIAGNOSIS — G31.84 MILD COGNITIVE IMPAIRMENT, SO STATED: ICD-10-CM

## 2018-01-08 DIAGNOSIS — N40.0 BENIGN PROSTATIC HYPERPLASIA WITHOUT LOWER URINARY TRACT SYMPMS: ICD-10-CM

## 2018-01-08 PROCEDURE — 99214 OFFICE O/P EST MOD 30 MIN: CPT

## 2018-01-10 RX ORDER — DONEPEZIL HYDROCHLORIDE 5 MG/1
5 TABLET ORAL
Qty: 30 | Refills: 1 | Status: ACTIVE | COMMUNITY
Start: 2017-05-25 | End: 1900-01-01

## 2018-01-10 RX ORDER — GALANTAMINE 8 MG/1
8 CAPSULE, EXTENDED RELEASE ORAL
Qty: 30 | Refills: 2 | Status: DISCONTINUED | COMMUNITY
Start: 2017-09-19 | End: 2018-01-10

## 2018-01-11 ENCOUNTER — OUTPATIENT (OUTPATIENT)
Dept: OUTPATIENT SERVICES | Facility: HOSPITAL | Age: 83
LOS: 1 days | Discharge: HOME | End: 2018-01-11

## 2018-01-11 DIAGNOSIS — C25.9 MALIGNANT NEOPLASM OF PANCREAS, UNSPECIFIED: ICD-10-CM

## 2018-01-12 ENCOUNTER — OTHER (OUTPATIENT)
Age: 83
End: 2018-01-12

## 2018-01-16 ENCOUNTER — APPOINTMENT (OUTPATIENT)
Dept: HEMATOLOGY ONCOLOGY | Facility: CLINIC | Age: 83
End: 2018-01-16
Payer: MEDICARE

## 2018-01-16 VITALS
HEART RATE: 79 BPM | TEMPERATURE: 97.3 F | OXYGEN SATURATION: 99 % | RESPIRATION RATE: 14 BRPM | WEIGHT: 138.43 LBS | DIASTOLIC BLOOD PRESSURE: 81 MMHG | SYSTOLIC BLOOD PRESSURE: 145 MMHG | BODY MASS INDEX: 23.04 KG/M2

## 2018-01-16 PROCEDURE — 99214 OFFICE O/P EST MOD 30 MIN: CPT

## 2018-01-16 RX ORDER — TAMSULOSIN HYDROCHLORIDE 0.4 MG/1
0.4 CAPSULE ORAL
Qty: 30 | Refills: 1 | Status: ACTIVE | COMMUNITY
Start: 2018-01-16 | End: 1900-01-01

## 2018-01-23 ENCOUNTER — APPOINTMENT (OUTPATIENT)
Dept: CT IMAGING | Facility: IMAGING CENTER | Age: 83
End: 2018-01-23
Payer: MEDICARE

## 2018-01-23 ENCOUNTER — APPOINTMENT (OUTPATIENT)
Dept: HEMATOLOGY ONCOLOGY | Facility: CLINIC | Age: 83
End: 2018-01-23
Payer: MEDICARE

## 2018-01-23 ENCOUNTER — OUTPATIENT (OUTPATIENT)
Dept: OUTPATIENT SERVICES | Facility: HOSPITAL | Age: 83
LOS: 1 days | End: 2018-01-23
Payer: MEDICARE

## 2018-01-23 VITALS
HEART RATE: 83 BPM | OXYGEN SATURATION: 98 % | DIASTOLIC BLOOD PRESSURE: 87 MMHG | SYSTOLIC BLOOD PRESSURE: 138 MMHG | WEIGHT: 138.89 LBS | BODY MASS INDEX: 23.11 KG/M2 | TEMPERATURE: 97.6 F | RESPIRATION RATE: 16 BRPM

## 2018-01-23 DIAGNOSIS — R14.0 ABDOMINAL DISTENSION (GASEOUS): ICD-10-CM

## 2018-01-23 DIAGNOSIS — K59.09 OTHER CONSTIPATION: ICD-10-CM

## 2018-01-23 PROCEDURE — 82565 ASSAY OF CREATININE: CPT

## 2018-01-23 PROCEDURE — 74177 CT ABD & PELVIS W/CONTRAST: CPT | Mod: 26

## 2018-01-23 PROCEDURE — 99214 OFFICE O/P EST MOD 30 MIN: CPT

## 2018-01-23 PROCEDURE — 74177 CT ABD & PELVIS W/CONTRAST: CPT

## 2018-01-26 ENCOUNTER — OTHER (OUTPATIENT)
Age: 83
End: 2018-01-26

## 2018-01-26 DIAGNOSIS — K86.89 OTHER SPECIFIED DISEASES OF PANCREAS: ICD-10-CM

## 2018-01-30 ENCOUNTER — APPOINTMENT (OUTPATIENT)
Dept: HEMATOLOGY ONCOLOGY | Facility: CLINIC | Age: 83
End: 2018-01-30

## 2018-01-31 ENCOUNTER — APPOINTMENT (OUTPATIENT)
Dept: HEMATOLOGY ONCOLOGY | Facility: CLINIC | Age: 83
End: 2018-01-31
Payer: MEDICARE

## 2018-01-31 ENCOUNTER — OTHER (OUTPATIENT)
Age: 83
End: 2018-01-31

## 2018-01-31 VITALS
WEIGHT: 143.3 LBS | TEMPERATURE: 97.7 F | SYSTOLIC BLOOD PRESSURE: 124 MMHG | DIASTOLIC BLOOD PRESSURE: 81 MMHG | OXYGEN SATURATION: 97 % | BODY MASS INDEX: 23.85 KG/M2 | RESPIRATION RATE: 17 BRPM | HEART RATE: 108 BPM

## 2018-01-31 DIAGNOSIS — C25.9 MALIGNANT NEOPLASM OF PANCREAS, UNSPECIFIED: ICD-10-CM

## 2018-01-31 PROCEDURE — 99214 OFFICE O/P EST MOD 30 MIN: CPT

## 2018-01-31 RX ORDER — OMEPRAZOLE 20 MG/1
20 CAPSULE, DELAYED RELEASE ORAL DAILY
Qty: 90 | Refills: 3 | Status: ACTIVE | COMMUNITY
Start: 2018-01-31 | End: 1900-01-01

## 2018-01-31 RX ORDER — LACTULOSE 10 G/15ML
10 SOLUTION ORAL 3 TIMES DAILY
Qty: 240 | Refills: 6 | Status: ACTIVE | COMMUNITY
Start: 2018-01-31 | End: 1900-01-01

## 2018-01-31 RX ORDER — TRAMADOL HYDROCHLORIDE 50 MG/1
50 TABLET, COATED ORAL
Qty: 30 | Refills: 2 | Status: ACTIVE | COMMUNITY
Start: 2018-01-31 | End: 1900-01-01

## 2018-02-07 ENCOUNTER — OTHER (OUTPATIENT)
Age: 83
End: 2018-02-07

## 2018-02-07 ENCOUNTER — APPOINTMENT (OUTPATIENT)
Dept: ULTRASOUND IMAGING | Facility: CLINIC | Age: 83
End: 2018-02-07

## 2018-02-12 ENCOUNTER — INPATIENT (INPATIENT)
Facility: HOSPITAL | Age: 83
LOS: 1 days | Discharge: HOSPICE | DRG: 436 | End: 2018-02-14
Attending: INTERNAL MEDICINE | Admitting: INTERNAL MEDICINE
Payer: OTHER MISCELLANEOUS

## 2018-02-12 VITALS
HEART RATE: 99 BPM | WEIGHT: 149.91 LBS | SYSTOLIC BLOOD PRESSURE: 121 MMHG | RESPIRATION RATE: 16 BRPM | HEIGHT: 66.54 IN | TEMPERATURE: 98 F | DIASTOLIC BLOOD PRESSURE: 80 MMHG | OXYGEN SATURATION: 97 %

## 2018-02-12 DIAGNOSIS — C25.9 MALIGNANT NEOPLASM OF PANCREAS, UNSPECIFIED: ICD-10-CM

## 2018-02-12 DIAGNOSIS — Z51.5 ENCOUNTER FOR PALLIATIVE CARE: ICD-10-CM

## 2018-02-12 DIAGNOSIS — R18.0 MALIGNANT ASCITES: ICD-10-CM

## 2018-02-12 DIAGNOSIS — R63.0 ANOREXIA: ICD-10-CM

## 2018-02-12 DIAGNOSIS — K59.01 SLOW TRANSIT CONSTIPATION: ICD-10-CM

## 2018-02-12 DIAGNOSIS — G89.3 NEOPLASM RELATED PAIN (ACUTE) (CHRONIC): ICD-10-CM

## 2018-02-12 LAB
ALBUMIN SERPL ELPH-MCNC: 3 G/DL — LOW (ref 3.3–5)
ALP SERPL-CCNC: 128 U/L — HIGH (ref 40–120)
ALT FLD-CCNC: 13 U/L — SIGNIFICANT CHANGE UP (ref 10–45)
ANION GAP SERPL CALC-SCNC: 18 MMOL/L — HIGH (ref 5–17)
AST SERPL-CCNC: 27 U/L — SIGNIFICANT CHANGE UP (ref 10–40)
BILIRUB SERPL-MCNC: 0.5 MG/DL — SIGNIFICANT CHANGE UP (ref 0.2–1.2)
BUN SERPL-MCNC: 39 MG/DL — HIGH (ref 7–23)
CALCIUM SERPL-MCNC: 9.3 MG/DL — SIGNIFICANT CHANGE UP (ref 8.4–10.5)
CHLORIDE SERPL-SCNC: 89 MMOL/L — LOW (ref 96–108)
CO2 SERPL-SCNC: 23 MMOL/L — SIGNIFICANT CHANGE UP (ref 22–31)
CREAT SERPL-MCNC: 1.28 MG/DL — SIGNIFICANT CHANGE UP (ref 0.5–1.3)
GLUCOSE SERPL-MCNC: 115 MG/DL — HIGH (ref 70–99)
HCT VFR BLD CALC: 40.4 % — SIGNIFICANT CHANGE UP (ref 39–50)
HGB BLD-MCNC: 13.2 G/DL — SIGNIFICANT CHANGE UP (ref 13–17)
MCHC RBC-ENTMCNC: 28.9 PG — SIGNIFICANT CHANGE UP (ref 27–34)
MCHC RBC-ENTMCNC: 32.7 GM/DL — SIGNIFICANT CHANGE UP (ref 32–36)
MCV RBC AUTO: 88.6 FL — SIGNIFICANT CHANGE UP (ref 80–100)
PLATELET # BLD AUTO: 246 K/UL — SIGNIFICANT CHANGE UP (ref 150–400)
POTASSIUM SERPL-MCNC: 5.6 MMOL/L — HIGH (ref 3.5–5.3)
POTASSIUM SERPL-SCNC: 5.6 MMOL/L — HIGH (ref 3.5–5.3)
PROT SERPL-MCNC: 6.6 G/DL — SIGNIFICANT CHANGE UP (ref 6–8.3)
RBC # BLD: 4.56 M/UL — SIGNIFICANT CHANGE UP (ref 4.2–5.8)
RBC # FLD: 13.9 % — SIGNIFICANT CHANGE UP (ref 10.3–14.5)
SODIUM SERPL-SCNC: 130 MMOL/L — LOW (ref 135–145)
WBC # BLD: 11.41 K/UL — HIGH (ref 3.8–10.5)
WBC # FLD AUTO: 11.41 K/UL — HIGH (ref 3.8–10.5)

## 2018-02-12 PROCEDURE — 99223 1ST HOSP IP/OBS HIGH 75: CPT | Mod: GC

## 2018-02-12 PROCEDURE — 76700 US EXAM ABDOM COMPLETE: CPT | Mod: 26

## 2018-02-12 RX ORDER — TRAMADOL HYDROCHLORIDE 50 MG/1
50 TABLET ORAL EVERY 6 HOURS
Qty: 0 | Refills: 0 | Status: DISCONTINUED | OUTPATIENT
Start: 2018-02-12 | End: 2018-02-14

## 2018-02-12 RX ORDER — ACETAMINOPHEN 500 MG
650 TABLET ORAL EVERY 6 HOURS
Qty: 0 | Refills: 0 | Status: DISCONTINUED | OUTPATIENT
Start: 2018-02-12 | End: 2018-02-14

## 2018-02-12 RX ORDER — SODIUM CHLORIDE 9 MG/ML
1000 INJECTION INTRAMUSCULAR; INTRAVENOUS; SUBCUTANEOUS
Qty: 0 | Refills: 0 | Status: DISCONTINUED | OUTPATIENT
Start: 2018-02-12 | End: 2018-02-14

## 2018-02-12 RX ORDER — UBIDECARENONE 100 MG
100 CAPSULE ORAL
Qty: 0 | Refills: 0 | COMMUNITY

## 2018-02-12 RX ORDER — ONDANSETRON 8 MG/1
4 TABLET, FILM COATED ORAL EVERY 6 HOURS
Qty: 0 | Refills: 0 | Status: DISCONTINUED | OUTPATIENT
Start: 2018-02-12 | End: 2018-02-14

## 2018-02-12 RX ORDER — CHOLECALCIFEROL (VITAMIN D3) 125 MCG
1000 CAPSULE ORAL
Qty: 0 | Refills: 0 | COMMUNITY

## 2018-02-12 RX ORDER — SODIUM CHLORIDE 9 MG/ML
1000 INJECTION, SOLUTION INTRAVENOUS
Qty: 0 | Refills: 0 | Status: DISCONTINUED | OUTPATIENT
Start: 2018-02-12 | End: 2018-02-12

## 2018-02-12 RX ORDER — MORPHINE SULFATE 50 MG/1
5 CAPSULE, EXTENDED RELEASE ORAL
Qty: 0 | Refills: 0 | Status: DISCONTINUED | OUTPATIENT
Start: 2018-02-12 | End: 2018-02-13

## 2018-02-12 RX ORDER — MORPHINE SULFATE 50 MG/1
4 CAPSULE, EXTENDED RELEASE ORAL
Qty: 0 | Refills: 0 | Status: DISCONTINUED | OUTPATIENT
Start: 2018-02-12 | End: 2018-02-13

## 2018-02-12 RX ORDER — NIACIN 50 MG
1 TABLET ORAL
Qty: 0 | Refills: 0 | COMMUNITY

## 2018-02-12 RX ORDER — LACTULOSE 10 G/15ML
10 SOLUTION ORAL THREE TIMES A DAY
Qty: 0 | Refills: 0 | Status: DISCONTINUED | OUTPATIENT
Start: 2018-02-12 | End: 2018-02-14

## 2018-02-12 RX ORDER — TAMSULOSIN HYDROCHLORIDE 0.4 MG/1
0.4 CAPSULE ORAL AT BEDTIME
Qty: 0 | Refills: 0 | Status: DISCONTINUED | OUTPATIENT
Start: 2018-02-12 | End: 2018-02-14

## 2018-02-12 RX ADMIN — MORPHINE SULFATE 4 MILLIGRAM(S): 50 CAPSULE, EXTENDED RELEASE ORAL at 15:00

## 2018-02-12 RX ADMIN — MORPHINE SULFATE 4 MILLIGRAM(S): 50 CAPSULE, EXTENDED RELEASE ORAL at 14:44

## 2018-02-12 RX ADMIN — MORPHINE SULFATE 5 MILLIGRAM(S): 50 CAPSULE, EXTENDED RELEASE ORAL at 12:44

## 2018-02-12 RX ADMIN — LACTULOSE 10 GRAM(S): 10 SOLUTION ORAL at 22:16

## 2018-02-12 RX ADMIN — MORPHINE SULFATE 5 MILLIGRAM(S): 50 CAPSULE, EXTENDED RELEASE ORAL at 13:00

## 2018-02-12 RX ADMIN — TAMSULOSIN HYDROCHLORIDE 0.4 MILLIGRAM(S): 0.4 CAPSULE ORAL at 22:16

## 2018-02-12 NOTE — H&P ADULT - HISTORY OF PRESENT ILLNESS
89 year old male with hx of diverticulosis, HLD, diagnosed with pancreatic cancer (1.6cm lesion at head of pancreas) in June of 2017. 89 year old male with hx of diverticulosis, HLD, diagnosed with pancreatic cancer (1.6cm lesion at head of pancreas) in June of 2017.   He underwent radiation therapy to reduce tumor size and resectability late summer/early fall 2017.  Follow up showed stable disease therefore oral chemotherapy was held d/t memory loss and side effects.   At some point, surgical option not revisited, patient transitioned to home hospice services.  He presents with 2-3 day hx of rapid decline in appetite, abdominal fullness and discomfort with increased abdominal swelling.  Discomfort is localized to lower half abdomen, not quite pain.  Nausea present, no vomiting.  No fevers, chills, diarrhea.  Denies weight loss but states bones are easier to see.  No BM in several days.  Patient is able to ambulate with assistance.  Presents from home hospice for abdominal paracentesis.

## 2018-02-12 NOTE — H&P ADULT - PMH
Benign prostatic hyperplasia without lower urinary tract symptoms    Diverticulosis of intestine without bleeding, unspecified intestinal tract location    Hyperlipidemia

## 2018-02-12 NOTE — H&P ADULT - NSHPSOCIALHISTORY_GEN_ALL_CORE
Former smoker, no recreational drug use, drinks wine, 4 x per week, recently none.  .  Lives with daughter and son-in-law.

## 2018-02-12 NOTE — H&P ADULT - NSHPREVIEWOFSYSTEMS_GEN_ALL_CORE
Constitutional, Eyes, ENT, Cardiovascular, Respiratory, Gastrointestinal, Genitourinary, Musculoskeletal, Integumentary, Neurological, Psychiatric, Endocrine, Heme/Lymph and Allergy/Immunology review of systems are otherwise negative except as noted in HPI.

## 2018-02-12 NOTE — H&P ADULT - NSHPPHYSICALEXAM_GEN_ALL_CORE
Constitutional: Well developed, cachectic  Eyes: PERRL, EOMI, anicteric  ENT: oropharynx moist, no obvious lesions  Neck: Supple, - JVD, thyroid not palpable, no masses  Pulmonary: Clear to A and P bilaterally  Cardiac: +S1 +S2 , no murmur  Vascular: no calf tenderness, pulses full distally  Abdomen: distended, + fluid, vague discomfort with palpation, no rebound, guarding or peritoneal signs  Lymphatic: no nodes appreciate  Musculoskeletal: FROM, generalized weakness  Skin: no obvious lesions  Neurology: no gross deficits  Psychiatric: Affect subdued.

## 2018-02-12 NOTE — H&P ADULT - ASSESSMENT
89 year old male with pancreatic cancer, sp radiation therapy, no chemo or surgical interventions, presents with worsening ascites and discomfort from same on home hospice.  Patient admitted for abdominal paracentesis.

## 2018-02-13 LAB
APTT BLD: 31.2 SEC — SIGNIFICANT CHANGE UP (ref 27.5–37.4)
INR BLD: 1.13 RATIO — SIGNIFICANT CHANGE UP (ref 0.88–1.16)
PROTHROM AB SERPL-ACNC: 12.8 SEC — SIGNIFICANT CHANGE UP (ref 10–13.1)

## 2018-02-13 PROCEDURE — 99233 SBSQ HOSP IP/OBS HIGH 50: CPT | Mod: GC

## 2018-02-13 RX ORDER — MORPHINE SULFATE 50 MG/1
4 CAPSULE, EXTENDED RELEASE ORAL
Qty: 0 | Refills: 0 | Status: DISCONTINUED | OUTPATIENT
Start: 2018-02-13 | End: 2018-02-14

## 2018-02-13 RX ADMIN — TAMSULOSIN HYDROCHLORIDE 0.4 MILLIGRAM(S): 0.4 CAPSULE ORAL at 22:58

## 2018-02-13 RX ADMIN — MORPHINE SULFATE 4 MILLIGRAM(S): 50 CAPSULE, EXTENDED RELEASE ORAL at 04:45

## 2018-02-13 RX ADMIN — LACTULOSE 10 GRAM(S): 10 SOLUTION ORAL at 15:27

## 2018-02-13 RX ADMIN — MORPHINE SULFATE 4 MILLIGRAM(S): 50 CAPSULE, EXTENDED RELEASE ORAL at 04:26

## 2018-02-13 RX ADMIN — LACTULOSE 10 GRAM(S): 10 SOLUTION ORAL at 22:58

## 2018-02-13 RX ADMIN — MORPHINE SULFATE 2 MILLIGRAM(S): 50 CAPSULE, EXTENDED RELEASE ORAL at 11:41

## 2018-02-13 RX ADMIN — MORPHINE SULFATE 4 MILLIGRAM(S): 50 CAPSULE, EXTENDED RELEASE ORAL at 11:56

## 2018-02-13 NOTE — PROGRESS NOTE ADULT - SUBJECTIVE AND OBJECTIVE BOX
Geriatric and Palliative Care Unit Progress Note [ ] Hospice Progress Note [ x]     HPI:  89 year old male with hx of diverticulosis, HLD, diagnosed with pancreatic cancer (1.6cm lesion at head of pancreas) in June of 2017.   He underwent radiation therapy to reduce tumor size and resectability late summer/early fall 2017.  Follow up showed stable disease therefore oral chemotherapy was held d/t memory loss and side effects.   At some point, surgical option not revisited, patient transitioned to home hospice services.  He presents with 2-3 day hx of rapid decline in appetite, abdominal fullness and discomfort with increased abdominal swelling.  Discomfort is localized to lower half abdomen, not quite pain.  Nausea present, no vomiting.  No fevers, chills, diarrhea.  Denies weight loss but states bones are easier to see.  No BM in several days.  Patient is able to ambulate with assistance.  Presents from home hospice for abdominal paracentesis. (12 Feb 2018 12:27)    Indication for Palliative Care Unit Services:    ADVANCE DIRECTIVES:    DNR [ x] YES [ ] NO                            [ x] Completed  MOLST  [ ] YES [x ] NO                      [ ] Completed  Health Care Proxy [ ] YES  [x ] NO   [ ] Completed  Living Will  [ ] YES [x ] NO             [x ] Surrogate  [ ] HCP  [ ] Guardian:             Elizabeth Moreno (daughter)                                                      Phone#:    Allergies    Cipro (Rash)    Intolerances        MEDICATIONS  (STANDING):  lactulose Syrup 10 Gram(s) Oral three times a day  sodium chloride 0.9%. 1000 milliLiter(s) (10 mL/Hr) IV Continuous <Continuous>  tamsulosin 0.4 milliGRAM(s) Oral at bedtime    MEDICATIONS  (PRN):  acetaminophen    Suspension 650 milliGRAM(s) Oral every 6 hours PRN For Temp greater than 38 C (100.4 F)  bisacodyl Suppository 10 milliGRAM(s) Rectal daily PRN Constipation  morphine  - Injectable 4 milliGRAM(s) IV Push every 1 hour PRN pain  morphine  - Injectable 4 milliGRAM(s) IV Push every 1 hour PRN dyspnea  ondansetron Injectable 4 milliGRAM(s) IV Push every 6 hours PRN Nausea and/or Vomiting  traMADol 50 milliGRAM(s) Oral every 6 hours PRN Severe Pain (7 - 10)      PRESENT SYMPTOMS:  Source: [x ] Patient   [ x] Family   [ ] Team     Pain:                        [ ] No [x ] Yes             [x ] Mild [ ] Moderate [ ] Severe    Onset - gradual   Location - diffuse to abdomen  Duration - progressive x days   Character - dull  Alleviating/Aggravating - some improvement with iv pain medications  Radiation - none  Timing -constant       Dyspnea:                [x ] No [ ] Yes             [ ] Mild [ ] Moderate [ ] Severe    Anxiety:                  [x ] No [ ] Yes             [ ] Mild [ ] Moderate [ ] Severe    Fatigue:                  [ ] No [x ] Yes             [ x] Mild [ ] Moderate [ ] Severe    Nausea:                  [x ] No [ ] Yes             [ ] Mild [ ] Moderate [ ] Severe    Loss of appetite:   [ ] No [x ] Yes             [x ] Mild [ ] Moderate [ ] Severe    Constipation:        [x ] No [ ] Yes             [ ] Mild [ ] Moderate [ ] Severe    Other Symptoms:  [x ] All other review of systems negative   [ ] Unable to obtain due to poor mentation     Karnofsky Performance Score/Palliative Performance Status Version 2:      40   %    PHYSICAL EXAM:  Vital Signs Last 24 Hrs  T(C): 36.4 (13 Feb 2018 08:10), Max: 36.5 (13 Feb 2018 00:05)  T(F): 97.5 (13 Feb 2018 08:10), Max: 97.7 (13 Feb 2018 00:05)  HR: 95 (13 Feb 2018 08:10) (90 - 95)  BP: 126/82 (13 Feb 2018 08:10) (112/72 - 126/82)  BP(mean): --  RR: 16 (13 Feb 2018 08:10) (16 - 16)  SpO2: 95% (13 Feb 2018 08:10) (95% - 97%) I&O's Summary    12 Feb 2018 07:01  -  13 Feb 2018 07:00  --------------------------------------------------------  IN: 0 mL / OUT: 300 mL / NET: -300 mL        General:  [ x] Alert  [x ] Oriented x3      [ ] Lethargic  [ ] Agitated   [ ] Cachexia   [ ] Unarousable  [x ] Verbal  [ ] Non-Verbal    HEENT:  [x ] Normal   [ ] Dry mouth   [ ] ET Tube    [ ] Trach  [ ] Oral lesions    Lungs:   [ x] Clear [ ] Tachypnea  [ ] Audible excessive secretions   [ ] Rhonchi        [ ] Right [ ] Left [ ] Bilateral  [ ] Crackles        [ ] Right [ ] Left [ ] Bilateral  [ ] Wheezing     [ ] Right [ ] Left [ ] Bilateral  [ ] Respiratory failure [ ] Acute [ ] Chronic [ ] Hypoxemic [ ] Hypercarbic [ ] Other    Cardiovascular:   [x ] Regular [ ] Irregular [ ] Tachycardia   [ ] Bradycardia  [ ] Murmur [ ] Other  [ ] Shock [ ] Septic [ ] Hypovolemic [ ] Neurogenic [ ] Cardiogenic   [ ] Vasopressors [ ] Inotrophs    Abdomen:   [ ] Soft  [x] Distended   [ ] +BS  [ ] Non tender [x ] Tender  [ ]PEG   [ ]OGT/ NGT   Last BM:   pt was admitted yesterday. no BM today   [ ] Other    Genitourinary:  [x ] Normal [ ] Incontinent   [ ] Oliguria/Anuria   [ ] Abad  [ ] Other       Musculoskeletal:    [ ] Normal   [x ] Weakness  [ ] Edema  [ ] Bedbound/Wheelchair bound [ ]  Ambulatory [ ] with [ ] without assistance [ ] Functional quadriplegia    Neurological: [ x] No focal deficits  [ ] Cognitive impairment  [ ] Dysphagia [ ] Dysarthria [ ] Paresis [ ] Other   [ ] Brain compression  [ ] Cerebral edema [ ] Encephalopathy    Skin: [x ] Normal   [ ] Ulcer(s) type [ ] Diabetic [ ] Pressure [ ] Other   Stage        POA [ ]  Yes [ ]  No       [ ] Rash    LABS:                        13.2   11.41 )-----------( 246      ( 12 Feb 2018 16:16 )             40.4     02-12    130<L>  |  89<L>  |  39<H>  ----------------------------<  115<H>  5.6<H>   |  23  |  1.28    Ca    9.3      12 Feb 2018 16:16    TPro  6.6  /  Alb  3.0<L>  /  TBili  0.5  /  DBili  x   /  AST  27  /  ALT  13  /  AlkPhos  128<H>  02-12    PT/INR - ( 13 Feb 2018 07:38 )   PT: 12.8 sec;   INR: 1.13 ratio         PTT - ( 13 Feb 2018 07:38 )  PTT:31.2 sec      Protein Calorie Malnutrition: [ ] Mild [ x] Moderate [ ] Severe    Oral Intake: [ ] Unable/mouth care only [ ] Minimal [ x] Moderate [ ] Full Capability  Diet: [ ] NPO [ ] Tube feeds [ ] TPN [ ] Other     RADIOLOGY & ADDITIONAL STUDIES:    REFERRALS:   [ ] Chaplaincy  [x ] Hospice Care  [ ] Child Life  [ ] Social Work  [ ] Case management [ ] Nutrition [ ] Holistic Therapy [ ] Wound Care [ ]Physical Therapy [ ] Other [ ]See goals of care note in [facility]

## 2018-02-13 NOTE — PROGRESS NOTE ADULT - SUBJECTIVE AND OBJECTIVE BOX
Interventional Radiology Pre-Procedure Note    This is a 89y Male with PMH of pancreatic CA who presented with worsening ascites and abdominal discomfort. US 2/12 demonstrates moderate ascites. Pt has never had paracentesis int he past. Plan is for paracentesis.     PAST MEDICAL & SURGICAL HISTORY:  Diverticulosis of intestine without bleeding, unspecified intestinal tract location  Benign prostatic hyperplasia without lower urinary tract symptoms  Hyperlipidemia  No significant past surgical history       Vital Signs Last 24 Hrs  T(C): 36.4 (13 Feb 2018 08:10), Max: 36.5 (13 Feb 2018 00:05)  T(F): 97.5 (13 Feb 2018 08:10), Max: 97.7 (13 Feb 2018 00:05)  HR: 95 (13 Feb 2018 08:10) (90 - 95)  BP: 126/82 (13 Feb 2018 08:10) (112/72 - 126/82)  BP(mean): --  RR: 16 (13 Feb 2018 08:10) (16 - 16)  SpO2: 95% (13 Feb 2018 08:10) (95% - 97%)    Allergies: Cipro (Rash)      Physical Exam: Gen:     Labs:                         13.2   11.41 )-----------( 246      ( 12 Feb 2018 16:16 )             40.4     02-12    130<L>  |  89<L>  |  39<H>  ----------------------------<  115<H>  5.6<H>   |  23  |  1.28    Ca    9.3      12 Feb 2018 16:16    TPro  6.6  /  Alb  3.0<L>  /  TBili  0.5  /  DBili  x   /  AST  27  /  ALT  13  /  AlkPhos  128<H>  02-12    PT/INR - ( 13 Feb 2018 07:38 )   PT: 12.8 sec;   INR: 1.13 ratio         PTT - ( 13 Feb 2018 07:38 )  PTT:31.2 sec    Plan is for paracentesis. The full procedure/ risks/ benefits/ alternatives to be discussed with pt and Informed consent to be obtained. DNR to be addressed. All questions and concerns have been addressed at this time. Interventional Radiology Pre-Procedure Note    This is a 89y Male with PMH of pancreatic CA who presented with worsening ascites and abdominal discomfort. US 2/12 demonstrates moderate ascites. Pt has never had paracentesis int he past. Plan is for paracentesis.     Spoke with palliative RN who confirmed with team that no specimens are needed.     PAST MEDICAL & SURGICAL HISTORY:  Diverticulosis of intestine without bleeding, unspecified intestinal tract location  Benign prostatic hyperplasia without lower urinary tract symptoms  Hyperlipidemia  No significant past surgical history       Vital Signs Last 24 Hrs  T(C): 36.4 (13 Feb 2018 08:10), Max: 36.5 (13 Feb 2018 00:05)  T(F): 97.5 (13 Feb 2018 08:10), Max: 97.7 (13 Feb 2018 00:05)  HR: 95 (13 Feb 2018 08:10) (90 - 95)  BP: 126/82 (13 Feb 2018 08:10) (112/72 - 126/82)  BP(mean): --  RR: 16 (13 Feb 2018 08:10) (16 - 16)  SpO2: 95% (13 Feb 2018 08:10) (95% - 97%)    Allergies: Cipro (Rash)      Labs:                         13.2   11.41 )-----------( 246      ( 12 Feb 2018 16:16 )             40.4     02-12    130<L>  |  89<L>  |  39<H>  ----------------------------<  115<H>  5.6<H>   |  23  |  1.28    Ca    9.3      12 Feb 2018 16:16    TPro  6.6  /  Alb  3.0<L>  /  TBili  0.5  /  DBili  x   /  AST  27  /  ALT  13  /  AlkPhos  128<H>  02-12    PT/INR - ( 13 Feb 2018 07:38 )   PT: 12.8 sec;   INR: 1.13 ratio         PTT - ( 13 Feb 2018 07:38 )  PTT:31.2 sec    Plan is for paracentesis. The full procedure/ risks/ benefits/ alternatives were discussed with pt HCP/ Daughter Elizabeth Pulido  and Informed consent to be obtained. DNR status addressed, HCP requested DNR be modified. HCP verbalizes understanding DNR will be reinstated upon returning to floor. All questions and concerns have been addressed at this time.

## 2018-02-13 NOTE — PROGRESS NOTE ADULT - PROBLEM SELECTOR PLAN 2
Likely due to neoplasm as well as abdominal distention from ascites.   - morphine 4 mg q1 hr prn pain   - will re-assess pain needs after pt returns from procedure.

## 2018-02-13 NOTE — PROGRESS NOTE ADULT - PROBLEM SELECTOR PLAN 1
Pt is pending IR paracentesis today. This is his first paracentesis, so will not place pleurex at this time.   US with signif ascites.

## 2018-02-13 NOTE — PROGRESS NOTE ADULT - ATTENDING COMMENTS
I have personally seen and examined this patient and agree with the above assessment and plan.   Paracentesis later today, pain control.  DC  planning .

## 2018-02-14 ENCOUNTER — TRANSCRIPTION ENCOUNTER (OUTPATIENT)
Age: 83
End: 2018-02-14

## 2018-02-14 VITALS
RESPIRATION RATE: 16 BRPM | SYSTOLIC BLOOD PRESSURE: 96 MMHG | DIASTOLIC BLOOD PRESSURE: 59 MMHG | OXYGEN SATURATION: 96 % | HEART RATE: 97 BPM | TEMPERATURE: 98 F

## 2018-02-14 PROCEDURE — 85730 THROMBOPLASTIN TIME PARTIAL: CPT

## 2018-02-14 PROCEDURE — 80053 COMPREHEN METABOLIC PANEL: CPT

## 2018-02-14 PROCEDURE — 49083 ABD PARACENTESIS W/IMAGING: CPT

## 2018-02-14 PROCEDURE — 85027 COMPLETE CBC AUTOMATED: CPT

## 2018-02-14 PROCEDURE — 85610 PROTHROMBIN TIME: CPT

## 2018-02-14 PROCEDURE — 76700 US EXAM ABDOM COMPLETE: CPT

## 2018-02-14 PROCEDURE — 99239 HOSP IP/OBS DSCHRG MGMT >30: CPT

## 2018-02-14 RX ORDER — TAMSULOSIN HYDROCHLORIDE 0.4 MG/1
1 CAPSULE ORAL
Qty: 0 | Refills: 0 | COMMUNITY
Start: 2018-02-14

## 2018-02-14 RX ORDER — TRAMADOL HYDROCHLORIDE 50 MG/1
1 TABLET ORAL
Qty: 0 | Refills: 0 | COMMUNITY
Start: 2018-02-14

## 2018-02-14 RX ADMIN — LACTULOSE 10 GRAM(S): 10 SOLUTION ORAL at 05:34

## 2018-02-14 NOTE — DISCHARGE NOTE ADULT - MEDICATION SUMMARY - MEDICATIONS TO STOP TAKING
I will STOP taking the medications listed below when I get home from the hospital:    Vitamin D3  -- 1000 unit(s) by mouth once a day    niacin 250 mg oral tablet, extended release  -- 1 tab(s) by mouth once a day (in the morning)    CoQ10  -- 100 milligram(s) by mouth once a day    amoxicillin-clavulanate 500 mg-125 mg oral tablet  -- 1 tab(s) by mouth 2 times a day MDD:two tablets daily    CT chest with contrast

## 2018-02-14 NOTE — DISCHARGE NOTE ADULT - HOSPITAL COURSE
89 year old male with hx of diverticulosis, HLD, diagnosed with pancreatic cancer (1.6cm lesion at head of pancreas) in June of 2017.   He underwent radiation therapy to reduce tumor size and resectability late summer/early fall 2017.  Follow up showed stable disease therefore oral chemotherapy was held d/t memory loss and side effects.   At some point, surgical option not revisited, patient transitioned to home hospice services.  He presents with 2-3 day hx of rapid decline in appetite, abdominal fullness and discomfort with increased abdominal swelling.  Discomfort is localized to lower half abdomen, not quite pain.  Nausea present, no vomiting.  No fevers, chills, diarrhea.  Denies weight loss but states bones are easier to see.  No BM in several days.  Patient is able to ambulate with assistance.  Presents from home hospice for abdominal paracentesis.  Pt received large volume paracentesis with IR with some relief of his discomfort. Pt to be discharged home with home hospice.

## 2018-02-14 NOTE — DISCHARGE NOTE ADULT - PLAN OF CARE
Comfort. Please call [facility] for any worsening abdominal distention or pain. Continue your home tramadol as needed.

## 2018-02-14 NOTE — DISCHARGE NOTE ADULT - MEDICATION SUMMARY - MEDICATIONS TO TAKE
I will START or STAY ON the medications listed below when I get home from the hospital:    traMADol 50 mg oral tablet  -- 1 tab(s) by mouth every 6 hours, As needed, Severe Pain (7 - 10)  -- Indication: For Pain due to neoplasm    tamsulosin 0.4 mg oral capsule  -- 1 cap(s) by mouth once a day (at bedtime)  -- Indication: For Benign prostatic hyperplasia

## 2018-02-14 NOTE — DISCHARGE NOTE ADULT - PATIENT PORTAL LINK FT
You can access the [facility] Portal, offered by [facility], by registering with the following website: [email or web address]

## 2018-02-14 NOTE — DISCHARGE NOTE ADULT - CARE PLAN
Principal Discharge DX:	Malignant ascites  Goal:	Comfort.  Assessment and plan of treatment:	Please call [facility] for any worsening abdominal distention or pain.  Secondary Diagnosis:	Pancreatic cancer  Assessment and plan of treatment:	Continue your home tramadol as needed.

## 2018-03-12 ENCOUNTER — APPOINTMENT (OUTPATIENT)
Dept: HEMATOLOGY ONCOLOGY | Facility: CLINIC | Age: 83
End: 2018-03-12

## 2018-03-20 ENCOUNTER — APPOINTMENT (OUTPATIENT)
Dept: RADIATION ONCOLOGY | Facility: CLINIC | Age: 83
End: 2018-03-20

## 2018-04-06 ENCOUNTER — APPOINTMENT (OUTPATIENT)
Dept: INTERNAL MEDICINE | Facility: CLINIC | Age: 83
End: 2018-04-06

## 2018-04-30 ENCOUNTER — APPOINTMENT (OUTPATIENT)
Dept: NEUROLOGY | Facility: CLINIC | Age: 83
End: 2018-04-30

## 2019-01-03 LAB
ALBUMIN SERPL ELPH-MCNC: 4.1 G/DL
ALBUMIN SERPL ELPH-MCNC: 4.3 G/DL
ALP BLD-CCNC: 82 U/L
ALP BLD-CCNC: 84 U/L
ALT SERPL-CCNC: 11 U/L
ALT SERPL-CCNC: 19 U/L
ANION GAP SERPL CALC-SCNC: 11 MMOL/L
ANION GAP SERPL CALC-SCNC: 12 MMOL/L
AST SERPL-CCNC: 17 U/L
AST SERPL-CCNC: 19 U/L
BILIRUB SERPL-MCNC: 0.2 MG/DL
BILIRUB SERPL-MCNC: 0.4 MG/DL
BUN SERPL-MCNC: 13 MG/DL
BUN SERPL-MCNC: 19 MG/DL
CALCIUM SERPL-MCNC: 10.3 MG/DL
CALCIUM SERPL-MCNC: 9.8 MG/DL
CANCER AG19-9 SERPL-ACNC: 1313.3 U/ML
CANCER AG19-9 SERPL-ACNC: 1547.2 U/ML
CEA SERPL-MCNC: 8.1 NG/ML
CHLORIDE SERPL-SCNC: 102 MMOL/L
CHLORIDE SERPL-SCNC: 104 MMOL/L
CO2 SERPL-SCNC: 26 MMOL/L
CO2 SERPL-SCNC: 28 MMOL/L
CREAT SERPL-MCNC: 0.86 MG/DL
CREAT SERPL-MCNC: 0.89 MG/DL
GLUCOSE SERPL-MCNC: 102 MG/DL
GLUCOSE SERPL-MCNC: 112 MG/DL
POTASSIUM SERPL-SCNC: 4.7 MMOL/L
POTASSIUM SERPL-SCNC: 5 MMOL/L
PROT SERPL-MCNC: 7.2 G/DL
PROT SERPL-MCNC: 7.3 G/DL
SODIUM SERPL-SCNC: 141 MMOL/L
SODIUM SERPL-SCNC: 142 MMOL/L

## 2019-03-05 NOTE — PATIENT PROFILE ADULT. - ATTEMPT TO OOB
Case Management Discharge Note    Final Note: Pt dc'd home     Destination      No service has been selected for the patient.      Durable Medical Equipment      No service has been selected for the patient.      Dialysis/Infusion      No service has been selected for the patient.      Home Medical Care      No service has been selected for the patient.      Community Resources      No service has been selected for the patient.        Transportation Services  Private: Car  Other: (private vehicle )    Final Discharge Disposition Code: 01 - home or self-care   no

## 2021-03-26 NOTE — PATIENT PROFILE ADULT. - SOCIAL CONCERNS
Dr. Librado Tillman orders lovenox 1 mg/ kg. Pt is 91 kg today. 91 mg subq bid, as directed, when INR is under 2.0, Holding day of procedure. Rx of #20 called to [facility] in [place]. Pt and wife aware. Orders faxed to [facility] at [facility]. None

## 2023-10-01 PROBLEM — K86.89 PANCREATIC MASS: Status: ACTIVE | Noted: 2017-06-07

## 2024-09-06 NOTE — PATIENT PROFILE ADULT. - HEALTHCARE QUESTIONS, PROFILE
ASSESSMENT AND PLAN  1. 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  There is also a significant leg length discrepancy, the right leg being almost 1 inch shorter than the left.  Reymundo would likely benefit from hip replacement because he is suffering every day now, probably one half of a year.  I will sign him to Dr. Christian Hough      CHIEF COMPLAINT  Chief Complaint   Patient presents with   • Pain     R hip pain x 6 months; clicking noises           LABS      PAST MEDICAL HISTORY  Past Medical History:   Diagnosis Date   • A-fib  (CMD)    • Anxiety    • Chronic pain    • Essential (primary) hypertension    • Hyperlipemia        SOCIAL HISTORY  Social History     Tobacco Use   • Smoking status: Former     Current packs/day: 0.00     Average packs/day: 1 pack/day for 24.0 years (24.0 ttl pk-yrs)     Types: Cigarettes     Start date: 1956     Quit date:      Years since quittin.7   • Smokeless tobacco: Never   Vaping Use   • Vaping status: never used   Substance Use Topics   • Alcohol use: Not Currently     Comment: rare   • Drug use: Never       MEDICATIONS  Outpatient Medications Marked as Taking for the 24 encounter (Office Visit) with Lacho Abdullahi MD   Medication Sig Dispense Refill   • metoPROLOL tartrate (LOPRESSOR) 25 MG tablet Take 1 tablet by mouth every 12 hours. 180 tablet 3   • losartan (COZAAR) 25 MG tablet Take 1 tablet by mouth daily. 90 tablet 3   • gabapentin (NEURONTIN) 300 MG capsule Take 1 capsule by mouth in the morning and 1 capsule at noon and 1 capsule in the evening. 270 capsule 1   • acetaminophen (TYLENOL) 500 MG tablet Take 500 mg by mouth every 4 hours as needed for Pain.     • dabigatran (PRADAXA) 150 MG Cap Take 1 capsule by mouth every 12 hours. 180 capsule 3   • Omega-3 Fatty Acids (FISH OIL PO) Take by mouth daily.      • loperamide (IMODIUM) 2 MG capsule Take 2 mg by mouth 4 times daily as needed for Diarrhea.     •  clotrimazole-betamethasone (LOTRISONE) 1-0.05 % cream Apply 1 application topically as needed.     • calcium carbonate (TUMS) 500 MG chewable tablet Chew 1 tablet by mouth as needed for Heartburn.         HISTORY OF PRESENT ILLNESS  He is here today for followup of painful right hip.  Its gotten to the point now where he feels like he might fall down especially with his first steps in the morning.  He is walking with a cane.  Range of motion is significantly reduced.  Reymundo is otherwise enjoying reasonably good health.  He has a history of prostate cancer, slightly raising PSA but has been given reassurances that he does not have metastatic prostate cancer.  He would like to consider taking care of the hip problem because of the degree of pain    REVIEW OF SYSTEMS      PHYSICAL EXAM  This is a bright very nice 84-year-old gentleman  Visit Vitals  BP (!) 140/70 (BP Location: LUE - Left upper extremity, Patient Position: Sitting, Cuff Size: Regular)   Pulse 64   Ht 5' 8\" (1.727 m)   Wt 90 kg (198 lb 6.4 oz)   BMI 30.17 kg/m²     The left hip range of motion is somewhat impaired especially with internal rotation but is not painful to speak of.  The right hip range of motion is somewhat worse, and especially internal rotation immediately reproduces pain.       none